# Patient Record
Sex: MALE | Race: WHITE | Employment: OTHER | URBAN - METROPOLITAN AREA
[De-identification: names, ages, dates, MRNs, and addresses within clinical notes are randomized per-mention and may not be internally consistent; named-entity substitution may affect disease eponyms.]

---

## 2018-08-04 ENCOUNTER — HOSPITAL ENCOUNTER (OUTPATIENT)
Dept: CT IMAGING | Age: 58
Discharge: HOME | End: 2018-08-04
Attending: INTERNAL MEDICINE
Payer: COMMERCIAL

## 2018-08-04 DIAGNOSIS — K86.1 OTHER CHRONIC PANCREATITIS (HCC): ICD-10-CM

## 2018-08-04 LAB — CREAT UR-MCNC: 1 MG/DL (ref 0.6–1.3)

## 2018-08-04 PROCEDURE — 82565 ASSAY OF CREATININE: CPT

## 2018-08-04 PROCEDURE — 74011636320 HC RX REV CODE- 636/320: Performed by: INTERNAL MEDICINE

## 2018-08-04 PROCEDURE — 74160 CT ABDOMEN W/CONTRAST: CPT

## 2018-08-04 RX ADMIN — IOPAMIDOL 100 ML: 612 INJECTION, SOLUTION INTRAVENOUS at 09:00

## 2018-08-14 ENCOUNTER — HOSPITAL ENCOUNTER (OUTPATIENT)
Dept: CT IMAGING | Age: 58
Discharge: HOME | End: 2018-08-14
Attending: INTERNAL MEDICINE
Payer: COMMERCIAL

## 2018-08-14 DIAGNOSIS — R91.1 SOLITARY PULMONARY NODULE: ICD-10-CM

## 2018-08-14 DIAGNOSIS — R91.8 OTHER NONSPECIFIC ABNORMAL FINDING OF LUNG FIELD: ICD-10-CM

## 2018-08-14 PROCEDURE — 74011636320 HC RX REV CODE- 636/320: Performed by: INTERNAL MEDICINE

## 2018-08-14 PROCEDURE — 71260 CT THORAX DX C+: CPT

## 2018-08-14 RX ADMIN — IOPAMIDOL 100 ML: 612 INJECTION, SOLUTION INTRAVENOUS at 07:30

## 2020-07-27 ENCOUNTER — OFFICE VISIT (OUTPATIENT)
Dept: CARDIOLOGY CLINIC | Age: 60
End: 2020-07-27

## 2020-07-27 VITALS
OXYGEN SATURATION: 95 % | HEIGHT: 75 IN | DIASTOLIC BLOOD PRESSURE: 78 MMHG | BODY MASS INDEX: 35.31 KG/M2 | HEART RATE: 75 BPM | SYSTOLIC BLOOD PRESSURE: 126 MMHG | WEIGHT: 284 LBS

## 2020-07-27 DIAGNOSIS — E78.5 DYSLIPIDEMIA: ICD-10-CM

## 2020-07-27 DIAGNOSIS — G47.33 OBSTRUCTIVE SLEEP APNEA SYNDROME: ICD-10-CM

## 2020-07-27 DIAGNOSIS — E03.9 HYPOTHYROIDISM, UNSPECIFIED TYPE: ICD-10-CM

## 2020-07-27 DIAGNOSIS — Z98.890 STATUS POST CATHETER ABLATION OF ATRIAL FIBRILLATION: ICD-10-CM

## 2020-07-27 DIAGNOSIS — I48.0 PAROXYSMAL ATRIAL FIBRILLATION (HCC): Primary | ICD-10-CM

## 2020-07-27 RX ORDER — FENOFIBRATE 145 MG/1
145 TABLET, COATED ORAL DAILY
COMMUNITY
Start: 2020-06-29

## 2020-07-27 RX ORDER — LEVOTHYROXINE SODIUM 50 UG/1
50 TABLET ORAL
COMMUNITY
Start: 2020-06-30

## 2020-07-27 RX ORDER — MV/FA/DHA/EPA/FISH OIL/SAW/GNK 400MCG-200
1 COMBINATION PACKAGE (EA) ORAL DAILY
COMMUNITY

## 2020-07-27 RX ORDER — BISMUTH SUBSALICYLATE 262 MG
1 TABLET,CHEWABLE ORAL DAILY
COMMUNITY

## 2020-07-27 NOTE — PROGRESS NOTES
HISTORY OF PRESENT ILLNESS  Guadalupe Ng is a 62 y.o. male. Shortness of Breath   Pertinent negatives include no fever, no headaches, no cough, no wheezing, no PND, no orthopnea, no chest pain, no vomiting, no abdominal pain, no rash, no leg swelling and no claudication. New Patient   Associated symptoms include shortness of breath. Pertinent negatives include no chest pain, no abdominal pain and no headaches. Dizziness   Associated symptoms include shortness of breath. Pertinent negatives include no chest pain, no abdominal pain and no headaches. Fatigue   Associated symptoms include shortness of breath. Pertinent negatives include no chest pain, no abdominal pain and no headaches. Patient presents for a new office visit. Patient has a past medical history significant for paroxysmal atrial fibrillation, status post atrial fibrillation ablation in 2007. The patient also has a history of obstructive sleep apnea on nightly CPAP, dyslipidemia and hypothyroidism. The patient last underwent noninvasive cardiac testing with a stress echocardiogram in 2015 which was a normal study at [facility]. He has not been seen in a cardiologist office in over 7 years. He returns today complaining of worsening exertional dyspnea, fatigue and dizziness over the past 6 months. He states that symptoms are somewhat similar to his prior symptoms when he was diagnosed with atrial fibrillation many years ago. Patient reports he was last seen by his primary care physician about 1 year ago and was told he was in normal rhythm on an EKG in the office. He denies any chest pain or pressure. No leg swelling or claudication, no orthopnea no PND. He does complain of a near syncopal episode while he was in the store shopping. No rafaela syncope. Overall he feels his activity tolerance has significantly decreased over the past 6 to 9 months.     Past Medical History:   Diagnosis Date    Cro[facility] ([facility]     Dyslipidemia     History of cardiovascular stress test 09/2015    Normal stress echocardiogram    History of dizziness     Likely from autonomic insufficiency. Occasional brief loss of vision (10-15 secs).  History of echocardiogram 11/07/2012    EF 55-60%. No WMA. Normal RVSP/PASP.  Paroxysmal atrial fibrillation ([facility] 2007    s/p afib ablation    S/P ablation of atrial fibrillation 2007    Sleep apnea 2005-6    CPAP at night      Current Outpatient Medications   Medication Sig Dispense Refill    synthroid 50 mcg tablet       fenofibrate nanocrystallized (TRICOR) 145 mg tablet       krill oil 500 mg cap Take  by mouth.  multivitamin (ONE A DAY) tablet Take 1 Tab by mouth daily.  rivaroxaban (Xarelto) 20 mg tab tablet Take 1 Tab by mouth daily. 30 Tab 6    naproxen sodium (Aleve) 220 mg tablet Take 220 mg by mouth as needed. Allergies   Allergen Reactions    Cardizem [Diltiazem Hcl] Other (comments)     Headache. Review of Systems   Constitutional: Positive for fatigue. Negative for chills, fever and weight loss. HENT: Negative for nosebleeds. Eyes: Negative for blurred vision and double vision. Respiratory: Positive for shortness of breath. Negative for cough and wheezing. Cardiovascular: Negative for chest pain, palpitations, orthopnea, claudication, leg swelling and PND. Gastrointestinal: Negative for abdominal pain, heartburn, nausea and vomiting. Genitourinary: Negative for dysuria and hematuria. Musculoskeletal: Negative for falls and myalgias. Skin: Negative for rash. Neurological: Positive for dizziness. Negative for focal weakness and headaches. Endo/Heme/Allergies: Does not bruise/bleed easily. Psychiatric/Behavioral: Negative for substance abuse.      Visit Vitals  /78 (BP 1 Location: Left arm, BP Patient Position: Sitting)   Pulse 75   Ht 6' 3\" (1.905 m)   Wt 128.8 kg (284 lb)   SpO2 95%   BMI 35.50 kg/m² Physical Exam   Constitutional: He is oriented to person, place, and time. He appears well-developed and well-nourished. HENT:   Head: Normocephalic and atraumatic. Eyes: Conjunctivae are normal.   Neck: Neck supple. No JVD present. Carotid bruit is not present. Cardiovascular: Normal rate, S1 normal, S2 normal and normal pulses. An irregularly irregular rhythm present. Exam reveals distant heart sounds. Exam reveals no gallop and no S3. No murmur heard. Pulmonary/Chest: Breath sounds normal. He has no wheezes. He has no rales. Abdominal: Soft. Bowel sounds are normal. There is no abdominal tenderness. Musculoskeletal:         General: No tenderness, deformity or edema. Neurological: He is alert and oriented to person, place, and time. Skin: Skin is warm and dry. Psychiatric: He has a normal mood and affect. His behavior is normal. Thought content normal.     EKG: Suspected limb lead reversal, atrial fibrillation, right axis deviation, no ST-T wave changes concerning for ischemia. Compared to the previous EKG atrial fibrillation has replaced sinus rhythm, axis is shifted to the right    ASSESSMENT and PLAN    Paroxysmal atrial fibrillation. Status post atrial fibrillation ablation 2007. Patient is back in a rate controlled atrial fibrillation today. My suspicion is this may have been present on and off for the past 6 to 9 months given his symptom onset. I have recommended a trial of sinus rhythm with cardioversion after he has been anticoagulated for a total of 4 weeks. A prescription for Xarelto 20 mg daily was given to the patient. No need for rate control at this point. Patient is scheduled to have follow-up lab work through his PCPs office next week, so I have recommended he get a full electrolyte panel including magnesium level and a thyroid panel. Lastly, I have recommended a repeat echocardiogram.    Dyslipidemia.   Patient primarily has elevated triglycerides and he has been managed with fenofibrate and krill oil by his PCP. Hypothyroidism. Patient is been on a stable dose of Synthroid for many years now. Patient scheduled to have repeat blood work next week. Obstructive sleep apnea. On nightly CPAP. Followup in 4 weeks with our nurse practitioner. If he remains in atrial fibrillation at that time, I would like to schedule him for elective cardioversion to try and restore sinus rhythm. He should remain on oral anticoagulation at least for several months.

## 2020-07-27 NOTE — PATIENT INSTRUCTIONS
If you have not heard from the central scheduler to schedule your testing in 48 hours, please call [phone]. Start Xarelto 20 mg daily Get lab work  Done Mag ( add to PCP labs )

## 2020-07-27 NOTE — PROGRESS NOTES
Aiyana Bazzi presents today for   Chief Complaint   Patient presents with    New Patient     last seen in 2013    Shortness of Breath     with and without exertion    Dizziness     lightheaded all the time    Fatigue     all the time    Numbness     bottom of both feet       Aiyana Bazzi preferred language for health care discussion is english/other. Is someone accompanying this pt? no    Is the patient using any DME equipment during [address]? no    Depression Screening:  3 most recent PHQ Screens 7/27/2020   Little interest or pleasure in doing things Not at all   Feeling down, depressed, irritable, or hopeless Not at all   Total Score PHQ 2 0       Learning Assessment:  No flowsheet data found. Abuse Screening:  Abuse Screening Questionnaire 7/27/2020   Do you ever feel afraid of your partner? N   Are you in a relationship with someone who physically or mentally threatens you? N   Is it safe for you to go home? Y       Fall Risk  Fall Risk Assessment, last 12 mths 7/27/2020   Able to walk? Yes   Fall in past 12 months? No       Pt currently taking Anticoagulant therapy? no    Coordination of Care:  1. Have you been to the ER, urgent care clinic since your last visit? Hospitalized since your last visit? no    2. Have you seen or consulted any other health care providers outside of the [address] [place] [address] since your last visit? Include any pap smears or colon screening.  no

## 2020-07-28 DIAGNOSIS — I48.0 PAROXYSMAL ATRIAL FIBRILLATION (HCC): Primary | ICD-10-CM

## 2020-07-28 DIAGNOSIS — E03.9 HYPOTHYROIDISM, UNSPECIFIED TYPE: ICD-10-CM

## 2020-07-29 ENCOUNTER — HOSPITAL ENCOUNTER (OUTPATIENT)
Dept: LAB | Age: 60
Discharge: HOME | End: 2020-07-29
Payer: COMMERCIAL

## 2020-07-29 DIAGNOSIS — E03.9 HYPOTHYROIDISM, UNSPECIFIED TYPE: ICD-10-CM

## 2020-07-29 DIAGNOSIS — I48.0 PAROXYSMAL ATRIAL FIBRILLATION (HCC): ICD-10-CM

## 2020-07-29 LAB
ALBUMIN SERPL-MCNC: 3.9 G/DL (ref 3.4–5)
ALBUMIN/GLOB SERPL: 1.3 {RATIO} (ref 0.8–1.7)
ALP SERPL-CCNC: 48 U/L (ref 45–117)
ALT SERPL-CCNC: 30 U/L (ref 16–61)
ANION GAP SERPL CALC-SCNC: 4 MMOL/L (ref 3–18)
AST SERPL-CCNC: 19 U/L (ref 10–38)
BILIRUB SERPL-MCNC: 0.4 MG/DL (ref 0.2–1)
BUN SERPL-MCNC: 27 MG/DL (ref 7–18)
BUN/CREAT SERPL: 23 (ref 12–20)
CALCIUM SERPL-MCNC: 9.5 MG/DL (ref 8.5–10.1)
CHLORIDE SERPL-SCNC: 108 MMOL/L (ref 100–111)
CO2 SERPL-SCNC: 30 MMOL/L (ref 21–32)
CREAT SERPL-MCNC: 1.17 MG/DL (ref 0.6–1.3)
GLOBULIN SER CALC-MCNC: 2.9 G/DL (ref 2–4)
GLUCOSE SERPL-MCNC: 73 MG/DL (ref 74–99)
MAGNESIUM SERPL-MCNC: 2.1 MG/DL (ref 1.6–2.6)
POTASSIUM SERPL-SCNC: 4.4 MMOL/L (ref 3.5–5.5)
PROT SERPL-MCNC: 6.8 G/DL (ref 6.4–8.2)
SODIUM SERPL-SCNC: 142 MMOL/L (ref 136–145)
T4 FREE SERPL-MCNC: 1 NG/DL (ref 0.7–1.5)
TSH SERPL DL<=0.05 MIU/L-ACNC: 1.18 UIU/ML (ref 0.36–3.74)

## 2020-07-29 PROCEDURE — 80053 COMPREHEN METABOLIC PANEL: CPT

## 2020-07-29 PROCEDURE — 83735 ASSAY OF MAGNESIUM: CPT

## 2020-07-29 PROCEDURE — 84439 ASSAY OF FREE THYROXINE: CPT

## 2020-07-29 PROCEDURE — 36415 COLL VENOUS BLD VENIPUNCTURE: CPT

## 2020-08-03 ENCOUNTER — HOSPITAL ENCOUNTER (OUTPATIENT)
Dept: NON INVASIVE DIAGNOSTICS | Age: 60
Discharge: HOME | End: 2020-08-03
Attending: INTERNAL MEDICINE
Payer: COMMERCIAL

## 2020-08-03 VITALS
WEIGHT: 284 LBS | BODY MASS INDEX: 35.31 KG/M2 | HEIGHT: 75 IN | SYSTOLIC BLOOD PRESSURE: 126 MMHG | DIASTOLIC BLOOD PRESSURE: 78 MMHG

## 2020-08-03 DIAGNOSIS — I48.0 PAROXYSMAL ATRIAL FIBRILLATION (HCC): ICD-10-CM

## 2020-08-03 PROCEDURE — 93306 TTE W/DOPPLER COMPLETE: CPT

## 2020-08-04 LAB
ECHO AO ROOT DIAM: 3.66 CM
ECHO LA AREA 4C: 21.43 CM2
ECHO LA VOL 2C: 66.01 ML (ref 18–58)
ECHO LA VOL 4C: 55.12 ML (ref 18–58)
ECHO LA VOL BP: 71.61 ML (ref 18–58)
ECHO LA VOL/BSA BIPLANE: 28.12 ML/M2 (ref 16–28)
ECHO LA VOLUME INDEX A2C: 25.92 ML/M2 (ref 16–28)
ECHO LA VOLUME INDEX A4C: 21.64 ML/M2 (ref 16–28)
ECHO LV INTERNAL DIMENSION DIASTOLIC: 5.32 CM (ref 4.2–5.9)
ECHO LV INTERNAL DIMENSION SYSTOLIC: 2.79 CM
ECHO LV IVSD: 1.31 CM (ref 0.6–1)
ECHO LV MASS 2D: 274.7 G (ref 88–224)
ECHO LV MASS INDEX 2D: 107.9 G/M2 (ref 49–115)
ECHO LV POSTERIOR WALL DIASTOLIC: 1.2 CM (ref 0.6–1)
ECHO LVOT CARDIAC OUTPUT: 4.79 LITER/MINUTE
ECHO LVOT DIAM: 2.25 CM
ECHO LVOT PEAK GRADIENT: 3.04 MMHG
ECHO LVOT PEAK VELOCITY: 87.23 CM/S
ECHO LVOT SV: 61.9 ML
ECHO LVOT VTI: 15.58 CM
ECHO RV TAPSE: 2.47 CM (ref 1.5–2)
ECHO TV REGURGITANT MAX VELOCITY: 301.14 CM/S
ECHO TV REGURGITANT PEAK GRADIENT: 36.27 MMHG
LVOT MG: 1.5 MMHG

## 2020-08-07 NOTE — PROGRESS NOTES
Per your last note \" Paroxysmal atrial fibrillation. Status post atrial fibrillation ablation 2007. Patient is back in a rate controlled atrial fibrillation today. My suspicion is this may have been present on and off for the past 6 to 9 months given his symptom onset. I have recommended a trial of sinus rhythm with cardioversion after he has been anticoagulated for a total of 4 weeks. A prescription for Xarelto 20 mg daily was given to the patient. No need for rate control at this point. Patient is scheduled to have follow-up lab work through his PCPs office next week, so I have recommended he get a full electrolyte panel including magnesium level and a thyroid panel.   Lastly, I have recommended a repeat echocardiogram.

## 2020-08-17 NOTE — PROGRESS NOTES
Teo Cornelius presents today for a 4 week follow-up. He was seen by Dr. Warnell Osler in late July 2020. During that visit he was found to be in atrial fibrillation that was rate controlled. Based on his symptoms, he may have gone into atrial fibrillation 6 to 9 months prior to being seen. He was started on anticoagulation (Xarelto 20mg daily) and elective cardioversion was discussed after being anticoagulated for 4 weeks. He was asked to return today for re-evaluation. If he is still in atrial fibrillation, elective cardioversion will be scheduled. He had an echo done on 8/3/20 and it showed an EF of 55-60%, no WMA, mild aortic root dilatation at 3.7cm and moderate pulmonary hypertension with PASP 51 mmHg. He is a 62year old male with history of paroxysmal atrial fibrillation, status post atrial fibrillation ablation in 2007. The patient also has a history of obstructive sleep apnea on nightly CPAP, dyslipidemia and hypothyroidism. Denies chest pain, tightness, heaviness, and palpitations. Denies shortness of breath at rest, admits to dyspnea on exertion, orthopnea and PND. Denies abdominal bloating. Denies lightheadedness, admits to dizziness, and denies syncope. Denies lower extremity edema and claudication. Denies nausea, vomiting, diarrhea, melena, hematochezia. Denies hematuria, urgency, frequency. Denies fever, chills. He states that he is unaware of the arrhythmia aside from the HURTADO and dizziness. He had labs done through his PCP's office on 8/17/20 and his Hgb A1c was 6.1, free T4 was 0.98 (norm), TSH 1.74 (norm), BUN 21, Creat 1.0, potassium 4.7, Na 143, Total cholesterol 1 64, Trig 104, HDL 49, LDL 94. Hepatic panel was WNL. PMH:  Past Medical History:   Diagnosis Date    [facility] ([facility]     Dyslipidemia     History of cardiovascular stress test 09/2015    Normal stress echocardiogram    History of dizziness     Likely from autonomic insufficiency.   Occasional brief loss of vision (10-15 secs).  History of echocardiogram 11/07/2012    EF 55-60%. No WMA. Normal RVSP/PASP.  Paroxysmal atrial fibrillation ([address] 2007    s/p afib ablation    S/P ablation of atrial fibrillation 2007    Sleep apnea 2005-6    CPAP at night       PSH:  Past Surgical History:   Procedure Laterality Date    HX AFIB ABLATION      HX HERNIA REPAIR      X2    HX OTHER SURGICAL      arms    HX OTHER SURGICAL      Toe on left foot reattached after accident.  HX TONSILLECTOMY         MEDS:  Current Outpatient Medications   Medication Sig    synthroid 50 mcg tablet     fenofibrate nanocrystallized (TRICOR) 145 mg tablet     krill oil 500 mg cap Take  by mouth.  multivitamin (ONE A DAY) tablet Take 1 Tab by mouth daily.  rivaroxaban (Xarelto) 20 mg tab tablet Take 1 Tab by mouth daily.  naproxen sodium (Aleve) 220 mg tablet Take 220 mg by mouth as needed. No current facility-administered medications for this visit. Allergies and Sensitivities:  Allergies   Allergen Reactions    Cardizem [Diltiazem Hcl] Other (comments)     Headache. Family History:  No family history on file. Social History:  He  reports that he has never smoked. He has never used smokeless tobacco.  He  reports no history of alcohol use. Review of Systems:  As above, otherwise 10 point review negative. Physical:  Visit Vitals  /76 (BP 1 Location: Left arm, BP Patient Position: Sitting)   Pulse 67   Ht 6' 3\" (1.905 m)   Wt 128.4 kg (283 lb)   SpO2 98%   BMI 35.37 kg/m²         Exam:  Neck:  Supple, no JVD, no carotid bruits  CV:  Normal S1 and  S2, no murmurs, rubs, or gallops noted. Irregularly, irregular  Lungs:  Clear to ausculation throughout, no wheezes or rales  Abd:  Soft, non-tender, non-distended with good bowel sounds.   No hepatosplenomegaly  Extremities:  No edema      Data:  EKG:      LABS:  Lab Results   Component Value Date/Time    Sodium 142 07/29/2020 11:39 AM Potassium 4.4 07/29/2020 11:39 AM    Chloride 108 07/29/2020 11:39 AM    CO2 30 07/29/2020 11:39 AM    Glucose 73 (L) 07/29/2020 11:39 AM    BUN 27 (H) 07/29/2020 11:39 AM    Creatinine 1.17 07/29/2020 11:39 AM     No results found for: CHOL, CHOLX, CHLST, CHOLV, HDL, HDLP, LDL, LDLC, DLDLP, TGLX, TRIGL, TRIGP, CHHD, CHHDX  Lab Results   Component Value Date/Time    ALT (SGPT) 30 07/29/2020 11:39 AM         Impression/Plan:  1. Atrial fibrillation, s/p ablation in 2007, anticoagulated with Xarelto 20mg and rate controlled  2. Obstructive sleep apnea, using CPAP  3. Dyslipidemia, on TriCor 145 mg  4. Hypothyroidism    Mr. Martin Ortiz was seen today for re-evaluation of atrial fibrillation. Per Dr. Isabella Coon last office note dated 7/27/20, if he continues to be in atrial fibrillation, cardioversion would be scheduled. Mr. Martin Ortiz states that he is unaware of the atrial fibrillation as far as palpitations are concerned. He is symptomatic with it as he states that he experiences dyspnea on exertion and dizziness when he is in atrial fibrillation. His EKG today shows atrial fibrillation, rate controlled. He is anticoagulated with Xarelto which he is tolerating. He denies any other cardiac complaints. He states that he is going on vacation beginning this Wednesday, 8/26/20 and will be gone for about 10 days. Therefore, he is scheduled for cardioversion on 9/17/20 @ 8:00am.  Pre-procedure instructions were given by Funmilayo Walker and he was also given a lab slip for a BMP to be done within 7 days of the scheduled procedure. The procedure was discussed with him, his questions were answered, and he is ready to proceed. He was advised to continue his Xarelto. He will follow-up with Dr. Hollis Carroll as scheduled and as needed. Maryann Abdalla MSN, FNP-BC    Please note:  Portions of this chart were created with Dragon medical speech to text program.  Unrecognized errors may be present.

## 2020-08-24 ENCOUNTER — OFFICE VISIT (OUTPATIENT)
Dept: CARDIOLOGY CLINIC | Age: 60
End: 2020-08-24

## 2020-08-24 VITALS
BODY MASS INDEX: 35.19 KG/M2 | HEIGHT: 75 IN | OXYGEN SATURATION: 98 % | WEIGHT: 283 LBS | DIASTOLIC BLOOD PRESSURE: 76 MMHG | SYSTOLIC BLOOD PRESSURE: 132 MMHG | HEART RATE: 67 BPM

## 2020-08-24 DIAGNOSIS — I48.0 PAROXYSMAL ATRIAL FIBRILLATION (HCC): Primary | ICD-10-CM

## 2020-08-24 DIAGNOSIS — G47.33 OBSTRUCTIVE SLEEP APNEA SYNDROME: ICD-10-CM

## 2020-08-24 DIAGNOSIS — E78.5 DYSLIPIDEMIA: ICD-10-CM

## 2020-08-24 DIAGNOSIS — E66.01 SEVERE OBESITY (HCC): ICD-10-CM

## 2020-08-24 NOTE — PROGRESS NOTES
Roxy Lyon presents today for   Chief Complaint   Patient presents with    Irregular Heart Beat     4 week follow up        Roxy Lyon preferred language for health care discussion is english/other. Is someone accompanying this pt? no    Is the patient using any DME equipment during [address]? no    Depression Screening:  3 most recent PHQ Screens 8/24/2020   Little interest or pleasure in doing things Not at all   Feeling down, depressed, irritable, or hopeless Not at all   Total Score PHQ 2 0       Learning Assessment:  No flowsheet data found. Abuse Screening:  Abuse Screening Questionnaire 8/24/2020   Do you ever feel afraid of your partner? N   Are you in a relationship with someone who physically or mentally threatens you? N   Is it safe for you to go home? Y       Fall Risk  Fall Risk Assessment, last 12 mths 8/24/2020   Able to walk? Yes   Fall in past 12 months? No       Pt currently taking Anticoagulant therapy? Xarelto    Coordination of Care:  1. Have you been to the ER, urgent care clinic since your last visit? Hospitalized since your last visit? no    2. Have you seen or consulted any other health care providers outside of the [address] [place] [address] since your last visit? Include any pap smears or colon screening.  no

## 2020-09-03 ENCOUNTER — TELEPHONE (OUTPATIENT)
Dept: CARDIOLOGY CLINIC | Age: 60
End: 2020-09-03

## 2020-09-03 NOTE — TELEPHONE ENCOUNTER
----- Message from Mervin Acevedo MD sent at 8/3/2020  9:31 AM EDT -----  Please let the patient know that all of his labs were normal.  ----- Message -----  From: Dmitri Morales RN  Sent: 7/31/2020  11:48 AM EDT  To: Mervin Acevedo MD    Per your last note \" Paroxysmal atrial fibrillation. Status post atrial fibrillation ablation 2007. Patient is back in a rate controlled atrial fibrillation today. My suspicion is this may have been present on and off for the past 6 to 9 months given his symptom onset. I have recommended a trial of sinus rhythm with cardioversion after he has been anticoagulated for a total of 4 weeks. A prescription for Xarelto 20 mg daily was given to the patient. No need for rate control at this point. Patient is scheduled to have follow-up lab work through his PCPs office next week, so I have recommended he get a full electrolyte panel including magnesium level and a thyroid panel. Lastly, I have recommended a repeat echocardiogram.     Dyslipidemia. Patient primarily has elevated triglycerides and he has been managed with fenofibrate and krill oil by his PCP.     Hypothyroidism. Patient is been on a stable dose of Synthroid for many years now. Patient scheduled to have repeat blood work next week.

## 2020-09-03 NOTE — TELEPHONE ENCOUNTER
Called patient to give him his lab results. Verified name and . Gave patient results and he fully understood.

## 2020-09-17 ENCOUNTER — ANESTHESIA (OUTPATIENT)
Dept: CARDIAC CATH/INVASIVE PROCEDURES | Age: 60
End: 2020-09-17
Payer: COMMERCIAL

## 2020-09-17 ENCOUNTER — HOSPITAL ENCOUNTER (OUTPATIENT)
Age: 60
Setting detail: OUTPATIENT SURGERY
Discharge: HOME | End: 2020-09-17
Attending: INTERNAL MEDICINE | Admitting: INTERNAL MEDICINE
Payer: COMMERCIAL

## 2020-09-17 ENCOUNTER — ANESTHESIA EVENT (OUTPATIENT)
Dept: CARDIAC CATH/INVASIVE PROCEDURES | Age: 60
End: 2020-09-17
Payer: COMMERCIAL

## 2020-09-17 VITALS
HEART RATE: 68 BPM | WEIGHT: 280 LBS | SYSTOLIC BLOOD PRESSURE: 153 MMHG | BODY MASS INDEX: 34.82 KG/M2 | DIASTOLIC BLOOD PRESSURE: 95 MMHG | HEIGHT: 75 IN | RESPIRATION RATE: 34 BRPM | OXYGEN SATURATION: 100 %

## 2020-09-17 DIAGNOSIS — I48.91 AFIB (HCC): ICD-10-CM

## 2020-09-17 LAB
ATRIAL RATE: 77 BPM
BUN BLD-MCNC: 27 MG/DL (ref 7–18)
CALCULATED P AXIS, ECG09: 33 DEGREES
CALCULATED R AXIS, ECG10: 19 DEGREES
CALCULATED T AXIS, ECG11: 40 DEGREES
CHLORIDE BLD-SCNC: 105 MMOL/L (ref 100–108)
DIAGNOSIS, 93000: NORMAL
GLUCOSE BLD STRIP.AUTO-MCNC: 92 MG/DL (ref 74–106)
HCT VFR BLD CALC: 46 % (ref 36–49)
HGB BLD-MCNC: 15.6 G/DL (ref 12–16)
P-R INTERVAL, ECG05: 256 MS
POTASSIUM BLD-SCNC: 4.2 MMOL/L (ref 3.5–5.5)
Q-T INTERVAL, ECG07: 430 MS
QRS DURATION, ECG06: 96 MS
QTC CALCULATION (BEZET), ECG08: 486 MS
SODIUM BLD-SCNC: 141 MMOL/L (ref 136–145)
VENTRICULAR RATE, ECG03: 77 BPM

## 2020-09-17 PROCEDURE — 76060000031 HC ANESTHESIA FIRST 0.5 HR: Performed by: INTERNAL MEDICINE

## 2020-09-17 PROCEDURE — 74011250636 HC RX REV CODE- 250/636: Performed by: NURSE ANESTHETIST, CERTIFIED REGISTERED

## 2020-09-17 PROCEDURE — 82947 ASSAY GLUCOSE BLOOD QUANT: CPT

## 2020-09-17 PROCEDURE — 93005 ELECTROCARDIOGRAM TRACING: CPT

## 2020-09-17 PROCEDURE — 92960 CARDIOVERSION ELECTRIC EXT: CPT | Performed by: INTERNAL MEDICINE

## 2020-09-17 RX ORDER — SODIUM CHLORIDE 9 MG/ML
INJECTION, SOLUTION INTRAVENOUS
Status: DISCONTINUED | OUTPATIENT
Start: 2020-09-17 | End: 2020-09-17 | Stop reason: HOSPADM

## 2020-09-17 RX ORDER — PROPOFOL 10 MG/ML
INJECTION, EMULSION INTRAVENOUS AS NEEDED
Status: DISCONTINUED | OUTPATIENT
Start: 2020-09-17 | End: 2020-09-17 | Stop reason: HOSPADM

## 2020-09-17 RX ADMIN — SODIUM CHLORIDE: 900 INJECTION, SOLUTION INTRAVENOUS at 08:45

## 2020-09-17 RX ADMIN — PROPOFOL 50 MG: 10 INJECTION, EMULSION INTRAVENOUS at 09:01

## 2020-09-17 NOTE — ANESTHESIA POSTPROCEDURE EVALUATION
Procedure(s):  EP CARDIOVERSION.     general - backup    Anesthesia Post Evaluation      Multimodal analgesia: multimodal analgesia not used between 6 hours prior to anesthesia start to PACU discharge  Patient participation: complete - patient participated  Level of consciousness: awake  Pain management: satisfactory to patient  Airway patency: patent  Anesthetic complications: no  Cardiovascular status: acceptable  Respiratory status: acceptable  Hydration status: acceptable  Post anesthesia nausea and vomiting:  none      INITIAL Post-op Vital signs:   Vitals Value Taken Time   /91 9/17/2020  9:06 AM   Temp     Pulse 82 9/17/2020  9:06 AM   Resp 14 9/17/2020  9:06 AM   SpO2 100 % 9/17/2020  9:06 AM

## 2020-09-17 NOTE — DISCHARGE INSTRUCTIONS
DISCHARGE SUMMARY from Nurse    PATIENT INSTRUCTIONS:    After general anesthesia or intravenous sedation, for 24 hours or while taking prescription Narcotics:  · Limit your activities  · Do not drive and operate hazardous machinery  · Do not make important personal or business decisions  · Do  not drink alcoholic beverages  · If you have not urinated within 8 hours after discharge, please contact your surgeon on call. Report the following to your surgeon:  · Excessive pain, swelling, redness or odor of or around the surgical area  · Temperature over 100.5  · Nausea and vomiting lasting longer than 4 hours or if unable to take medications  · Any signs of decreased circulation or nerve impairment to extremity: change in color, persistent  numbness, tingling, coldness or increase pain  · Any questions    What to do at Home:  Recommended activity: Activity as tolerated and no driving for today. If you experience any of the following symptoms pain unrelieved by over the counter pain medication, please follow up with Dr. Tristan Guerrero. *  Please give a list of your current medications to your Primary Care Provider. *  Please update this list whenever your medications are discontinued, doses are      changed, or new medications (including over-the-counter products) are added. *  Please carry medication information at all times in case of emergency situations. These are general instructions for a healthy lifestyle:    No smoking/ No tobacco products/ Avoid exposure to second hand smoke  Surgeon General's Warning:  Quitting smoking now greatly reduces serious risk to your health.     Obesity, smoking, and sedentary lifestyle greatly increases your risk for illness    A healthy diet, regular physical exercise & weight monitoring are important for maintaining a healthy lifestyle    You may be retaining fluid if you have a history of heart failure or if you experience any of the following symptoms:  Weight gain of 3 pounds or more overnight or 5 pounds in a week, increased swelling in our hands or feet or shortness of breath while lying flat in bed. Please call your doctor as soon as you notice any of these symptoms; do not wait until your next office visit. The discharge information has been reviewed with the patient. The patient verbalized understanding. Discharge medications reviewed with the patient and appropriate educational materials and side effects teaching were provided. ___________________________________________________________________________________________________________________________________      Patient Education        Electrical Cardioversion: Care Instructions  Your Care Instructions     Electrical cardioversion is a treatment for an abnormal heartbeat. For example, it may be used to treat atrial fibrillation. In cardioversion, a brief electric shock is given to the heart to reset its rhythm. The shock comes through patches that are put on the outside of your chest. Cardioversion most often restores the heartbeat to normal.  After the procedure, you may have redness where the patches were. (This may look like a sunburn.) Do not drive until the day after a cardioversion. You can eat and drink when you feel ready to. Your doctor may have you take medicines daily to help the heart beat in a normal way and to prevent blood clots. Your doctor may give you medicine before and after cardioversion. This is to help keep your heart in a normal rhythm after the procedure. Instead of electric cardioversion, your doctor may try to change your heartbeat to a normal rhythm by giving you medicine. This is most often done in a clinic or hospital.  You may have had a sedative to help you relax. You may be unsteady after having sedation. It can take a few hours for the medicine's effects to wear off. Common side effects of sedation include nausea, vomiting, and feeling sleepy or tired.   The doctor has checked you carefully, but problems can develop later. If you notice any problems or new symptoms, get medical treatment right away. Follow-up care is a key part of your treatment and safety. Be sure to make and go to all appointments, and call your doctor if you are having problems. It's also a good idea to know your test results and keep a list of the medicines you take. How can you care for yourself at home? Medicines    · If the doctor gave you a sedative:  ? For 24 hours, don't do anything that requires attention to detail. This includes going to work, making important decisions, or signing any legal documents. It takes time for the medicine's effects to completely wear off.  ? For your safety, do not drive or operate any machinery that could be dangerous. Wait until the medicine wears off and you can think clearly and react easily.     · Take your medicines exactly as prescribed. Call your doctor if you think you are having a problem with your medicine. You may take some of the following medicines:  ? Antiarrhythmic medicines such as amiodarone. ? Beta-blockers such as propranolol (Inderal), metoprolol (Lopressor), and carvedilol (Coreg). ? Calcium channel blockers such as diltiazem (Cardizem) and verapamil (Calan). ? Digoxin (Lanoxin). You will get more details on the specific medicines your doctor prescribes.     · If you take a blood thinner, be sure you get instructions about how to take your medicine safely. Blood thinners can cause serious bleeding problems.     · Do not take any vitamins, over-the-counter medicines, or herbal products without talking to your doctor first.   Lifestyle changes    · Do not smoke. Smoking increases your risk of stroke and heart attack. If you need help quitting, talk to your doctor about stop-smoking programs and medicines.  These can increase your chances of quitting for good.     · Eat heart-healthy foods.     · Limit alcohol to 2 drinks a day for men and 1 drink a day for women. Activity    · If your doctor recommends it, get more exercise. Walking is a good choice. Bit by bit, increase the amount you walk every day. Try for 30 minutes on most days of the week. You also may want to swim, bike, or do other activities.     · When you exercise, watch for signs that your heart is working too hard. You are pushing too hard if you cannot talk while you are exercising. If you become short of breath or dizzy or have chest pain, sit down and rest immediately.     · Check your pulse regularly. Place two fingers on the artery at the palm side of your wrist in line with your thumb. If your heartbeat seems uneven or fast, talk to your doctor. When should you call for help? Call 911 anytime you think you may need emergency care. For example, call if:    · You have trouble breathing.     · You passed out (lost consciousness).     · You cough up pink, foamy mucus and you have trouble breathing.     · You have symptoms of a heart attack. These may include:  ? Chest pain or pressure, or a strange feeling in the chest.  ? Sweating. ? Shortness of breath. ? Nausea or vomiting. ? Pain, pressure, or a strange feeling in the back, neck, jaw, or upper belly or in one or both shoulders or arms. ? Lightheadedness or sudden weakness. ? A fast or irregular heartbeat. After you call 911, the  may tell you to chew 1 adult-strength or 2 to 4 low-dose aspirin. Wait for an ambulance. Do not try to drive yourself.     · You have symptoms of a stroke. These may include:  ? Sudden numbness, tingling, weakness, or loss of movement in your face, arm, or leg, especially on only one side of your body. ? Sudden vision changes. ? Sudden trouble speaking. ? Sudden confusion or trouble understanding simple statements. ? Sudden problems with walking or balance. ? A sudden, severe headache that is different from past headaches.    Call your doctor now or seek immediate medical care if:    · You have new or worse nausea or vomiting.     · You have new or increased shortness of breath.     · You are dizzy or lightheaded, or you feel like you may faint.     · You have sudden weight gain, such as more than 2 to 3 pounds in a day or 5 pounds in a week.     · You have increased swelling in your legs, ankles, or feet. Watch closely for changes in your health, and be sure to contact your doctor if you have any problems. Where can you learn more? Go to http://clau-ahmet.info/  Enter R378 in the search box to learn more about \"Electrical Cardioversion: Care Instructions. \"  Current as of: December 16, 2019               Content Version: 12.6  © 1798-8171 [employer]. Care instructions adapted under license by [employer] (which disclaims liability or warranty for this information). If you have questions about a medical condition or this instruction, always ask your healthcare professional. [place] [address] any warranty or liability for your use of this information.

## 2020-09-17 NOTE — PROGRESS NOTES
Discharge instructions discussed with patient. Patient verbalized understanding and had no further questions. PIV was removed without issue and patient was disconnected from the monitor. Pt has no c/o pain.

## 2020-09-17 NOTE — PROCEDURES
Procedure Note - Cardioversion:   Performed by Greg Sabillon MD .     Immediately prior to the procedure, the patient was reevaluated and found suitable for the planned procedure and any planned medications. Immediately prior to the procedure a time out was called to verify the correct patient, procedure, equipment, staff, and marking as appropriate. Cardioversion was indicated for a rhythm of atrial fibrillation and performed 2 times with a maximum delivered energy of 300 joules,  biphasic. Adequate procedural sedation was attained, see moderate sedation record. Patient's rhythm was normal sinus rhythm at the end of the procedure. Procedure was tolerated well.

## 2020-09-17 NOTE — PROGRESS NOTES
Melissa Jimenez presents today for a post-hospital follow-up s/p elective cardioversion. He underwent cardioversion on 9/17/20 and converted back to sinus rhythm. Unfortunately, he noticed a return of his symptoms of dizziness and shortness of breath not too long after the procedure was done. He does not notice the irregularity of his heart beat but each time he has gone into AFib, he experiences shortness of breath and dizziness. He is a 62year old male with history of paroxysmal atrial fibrillation, status post atrial fibrillation ablation in 2007. The patient also has a history of obstructive sleep apnea on nightly CPAP, dyslipidemia and hypothyroidism. e was seen by Dr. Dennie Gift in late July 2020. During that visit he was found to be in atrial fibrillation that was rate controlled. Based on his symptoms, he may have gone into atrial fibrillation 6 to 9 months prior to being seen. He was started on anticoagulation (Xarelto 20mg daily) and elective cardioversion was discussed after being anticoagulated for 4 weeks. He was asked to return today for re-evaluation. If he is still in atrial fibrillation, elective cardioversion will be scheduled. He had an echo done on 8/3/20 and it showed an EF of 55-60%, no WMA, mild aortic root dilatation at 3.7cm and moderate pulmonary hypertension with PASP 51 mmHg. Denies chest pain, tightness, heaviness, and palpitations. Denies shortness of breath at rest, admits to dyspnea on exertion, orthopnea and PND. Denies abdominal bloating. Denies lightheadedness, admits to dizziness, and denies syncope. Denies lower extremity edema and claudication. Denies nausea, vomiting, diarrhea, melena, hematochezia. Denies hematuria, urgency, frequency. Denies fever, chills.       He had labs done through his PCP's office on 8/17/20 and his Hgb A1c was 6.1, free T4 was 0.98 (norm), TSH 1.74 (norm), BUN 21, Creat 1.0, potassium 4.7, Na 143, Total cholesterol 1 64, Trig 104, HDL 49, LDL 94. Hepatic panel was WNL. PMH:  Past Medical History:   Diagnosis Date    [facility]     Dyslipidemia     History of cardiovascular stress test 09/2015    Normal stress echocardiogram    History of dizziness     Likely from autonomic insufficiency. Occasional brief loss of vision (10-15 secs).  History of echocardiogram 11/07/2012    EF 55-60%. No WMA. Normal RVSP/PASP.  Paroxysmal atrial fibrillation ([facility] [facility] 2007    s/p afib ablation    S/P ablation of atrial fibrillation 2007    Sleep apnea 2005-6    CPAP at night       PSH:  Past Surgical History:   Procedure Laterality Date    HX AFIB ABLATION      HX HERNIA REPAIR      X2    HX OTHER SURGICAL      arms    HX OTHER SURGICAL      Toe on left foot reattached after accident.  HX TONSILLECTOMY         MEDS:  Current Outpatient Medications   Medication Sig    synthroid 50 mcg tablet     fenofibrate nanocrystallized (TRICOR) 145 mg tablet     krill oil 500 mg cap Take  by mouth.  multivitamin (ONE A DAY) tablet Take 1 Tab by mouth daily.  rivaroxaban (Xarelto) 20 mg tab tablet Take 1 Tab by mouth daily.  naproxen sodium (Aleve) 220 mg tablet Take 220 mg by mouth as needed. No current facility-administered medications for this visit. Allergies and Sensitivities:  Allergies   Allergen Reactions    Cardizem [Diltiazem Hcl] Other (comments)     Headache. Family History:  No family history on file. Social History:  He  reports that he has never smoked. He has never used smokeless tobacco.  He  reports no history of alcohol use. Review of Systems:  As above, otherwise 10 point review negative. Physical:  Visit Vitals  /84   Pulse 63   Ht 6' 3\" (1.905 m)   Wt 127.5 kg (281 lb)   SpO2 98%   BMI 35.12 kg/m²     His weight is down 2 pounds since his last visit      Exam:  Neck:  Supple, no JVD, no carotid bruits  CV:  Normal S1 and  S2, no murmurs, rubs, or gallops noted. Irregularly, irregular  Lungs:  Clear to ausculation throughout, no wheezes or rales  Abd:  Soft, non-tender, non-distended with good bowel sounds. No hepatosplenomegaly  Extremities:  Trace lower extremity edema      Data:  EKG:      LABS:  Lab Results   Component Value Date/Time    Sodium 142 07/29/2020 11:39 AM    Potassium 4.4 07/29/2020 11:39 AM    Chloride 108 07/29/2020 11:39 AM    CO2 30 07/29/2020 11:39 AM    Glucose 73 (L) 07/29/2020 11:39 AM    BUN 27 (H) 07/29/2020 11:39 AM    Creatinine 1.17 07/29/2020 11:39 AM     No results found for: CHOL, CHOLX, CHLST, CHOLV, HDL, HDLP, LDL, LDLC, DLDLP, TGLX, TRIGL, TRIGP, CHHD, CHHDX  Lab Results   Component Value Date/Time    ALT (SGPT) 30 07/29/2020 11:39 AM         Impression/Plan:  1. Atrial fibrillation, s/p ablation in 2007, anticoagulated with Xarelto 20mg and rate controlled, s/p successful cardioversion on 9/17/20, but now back in AFib  2. Obstructive sleep apnea, using CPAP  3. Dyslipidemia, on TriCor 145 mg  4. Hypothyroidism  5. Dizziness/SOB associated with AFib (symptoms when he is in AFib)    Mr. Abril Severino was seen today for follow-up s/p cardioversion. He was cardioverted on 9/17/20 which was successful but unfortunately, he is back in atrial fibrillation. He is symptomatic and his symptoms are dizziness and shortness of breath. He would like to have another ablation done (history of ablation in 2007) so will refer him to Dr. Ines Lemon. Mr. Abril Severino informed me that he does not like to take medications as he is not good with remembering to take them all the time so he would rather have a procedure done to see if we can get him back into sinus rhythm. His blood pressure and heart rate are controlled. His EKG shows AFib. He remains on his Xarelto and was instructed to continue taking the medications. He will follow-up with Dr. Shar Rodriguez as scheduled and as needed.       Monica Morales MSN, FNP-BC    Please note:  Portions of this chart were created with Dragon medical speech to text program.  Unrecognized errors may be present.

## 2020-09-17 NOTE — ANESTHESIA PREPROCEDURE EVALUATION
Relevant Problems   No relevant active problems       Anesthetic History   No history of anesthetic complications            Review of Systems / Medical History  Patient summary reviewed and pertinent labs reviewed    Pulmonary        Sleep apnea           Neuro/Psych   Within defined limits           Cardiovascular            Dysrhythmias : atrial fibrillation  Hyperlipidemia    Exercise tolerance: >4 METS     GI/Hepatic/Renal  Within defined limits              Endo/Other      Hypothyroidism  Morbid obesity     Other Findings   Comments: Crohn disease (HCC)  Sleep apnea  Paroxysmal atrial fibrillation (HCC)  Dyslipidemia  History of dizziness  History of echocardiogram  History of cardiovascular stress test  S/P ablation of atrial fibrillation           Physical Exam    Airway  Mallampati: II  TM Distance: 4 - 6 cm  Neck ROM: normal range of motion   Mouth opening: Normal     Cardiovascular  Regular rate and rhythm,  S1 and S2 normal,  no murmur, click, rub, or gallop  Rhythm: regular  Rate: normal         Dental  No notable dental hx       Pulmonary  Breath sounds clear to auscultation               Abdominal  GI exam deferred       Other Findings            Anesthetic Plan    ASA: 3  Anesthesia type: general - backup          Induction: Intravenous

## 2020-09-17 NOTE — H&P
HISTORY OF PRESENT ILLNESS  Julian Christine is a 62 y.o. male.     Shortness of Breath   Pertinent negatives include no fever, no headaches, no cough, no wheezing, no PND, no orthopnea, no chest pain, no vomiting, no abdominal pain, no rash, no leg swelling and no claudication. New Patient   Associated symptoms include shortness of breath. Pertinent negatives include no chest pain, no abdominal pain and no headaches. Dizziness   Associated symptoms include shortness of breath. Pertinent negatives include no chest pain, no abdominal pain and no headaches. Fatigue   Associated symptoms include shortness of breath. Pertinent negatives include no chest pain, no abdominal pain and no headaches.         Patient presents for a new office visit. Patient has a past medical history significant for paroxysmal atrial fibrillation, status post atrial fibrillation ablation in 2007. The patient also has a history of obstructive sleep apnea on nightly CPAP, dyslipidemia and hypothyroidism.     The patient last underwent noninvasive cardiac testing with a stress echocardiogram in 2015 which was a normal study at [facility].     He has not been seen in a cardiologist office in over 7 years. He returns today complaining of worsening exertional dyspnea, fatigue and dizziness over the past 6 months. He states that symptoms are somewhat similar to his prior symptoms when he was diagnosed with atrial fibrillation many years ago. Patient reports he was last seen by his primary care physician about 1 year ago and was told he was in normal rhythm on an EKG in the office. He denies any chest pain or pressure. No leg swelling or claudication, no orthopnea no PND. He does complain of a near syncopal episode while he was in the store shopping.   No rafaela syncope.     Overall he feels his activity tolerance has significantly decreased over the past 6 to 9 months.          Past Medical History:   Diagnosis Date  Crohn disease ([facility]      Dyslipidemia      History of cardiovascular stress test 09/2015     Normal stress echocardiogram    History of dizziness       Likely from autonomic insufficiency. Occasional brief loss of vision (10-15 secs).  History of echocardiogram 11/07/2012     EF 55-60%. No WMA. Normal RVSP/PASP.  [facility] 2007     s/p afib ablation    S/P ablation of atrial fibrillation 2007    Sleep apnea 2005-6     CPAP at night             Current Outpatient Medications   Medication Sig Dispense Refill    synthroid 50 mcg tablet          fenofibrate nanocrystallized (TRICOR) 145 mg tablet          krill oil 500 mg cap Take  by mouth.        multivitamin (ONE A DAY) tablet Take 1 Tab by mouth daily.        rivaroxaban (Xarelto) 20 mg tab tablet Take 1 Tab by mouth daily. 30 Tab 6    naproxen sodium (Aleve) 220 mg tablet Take 220 mg by mouth as needed.                 Allergies   Allergen Reactions    Cardizem [Diltiazem Hcl] Other (comments)       Headache.            Review of Systems   Constitutional: Positive for fatigue. Negative for chills, fever and weight loss. HENT: Negative for nosebleeds. Eyes: Negative for blurred vision and double vision. Respiratory: Positive for shortness of breath. Negative for cough and wheezing. Cardiovascular: Negative for chest pain, palpitations, orthopnea, claudication, leg swelling and PND. Gastrointestinal: Negative for abdominal pain, heartburn, nausea and vomiting. Genitourinary: Negative for dysuria and hematuria. Musculoskeletal: Negative for falls and myalgias. Skin: Negative for rash. Neurological: Positive for dizziness. Negative for focal weakness and headaches. Endo/Heme/Allergies: Does not bruise/bleed easily.    Psychiatric/Behavioral: Negative for substance abuse.      Visit Vitals  /78 (BP 1 Location: Left arm, BP Patient Position: Sitting)   Pulse 75   Ht 6' 3\" (1.905 m)   Wt 128.8 kg (284 lb)   SpO2 95%   BMI 35.50 kg/m²      Physical Exam   Constitutional: He is oriented to person, place, and time. He appears well-developed and well-nourished. HENT:   Head: Normocephalic and atraumatic. Eyes: Conjunctivae are normal.   Neck: Neck supple. No JVD present. Carotid bruit is not present. Cardiovascular: Normal rate, S1 normal, S2 normal and normal pulses. An irregularly irregular rhythm present. Exam reveals distant heart sounds. Exam reveals no gallop and no S3. No murmur heard. Pulmonary/Chest: Breath sounds normal. He has no wheezes. He has no rales. Abdominal: Soft. Bowel sounds are normal. There is no abdominal tenderness. Musculoskeletal:         General: No tenderness, deformity or edema. Neurological: He is alert and oriented to person, place, and time. Skin: Skin is warm and dry. Psychiatric: He has a normal mood and affect. His behavior is normal. Thought content normal.      EKG: Suspected limb lead reversal, atrial fibrillation, right axis deviation, no ST-T wave changes concerning for ischemia. Compared to the previous EKG atrial fibrillation has replaced sinus rhythm, axis is shifted to the right     ASSESSMENT and PLAN     Paroxysmal atrial fibrillation. Status post atrial fibrillation ablation 2007. Patient is back in a rate controlled atrial fibrillation today. My suspicion is this may have been present on and off for the past 6 to 9 months given his symptom onset. I have recommended a trial of sinus rhythm with cardioversion after he has been anticoagulated for a total of 4 weeks.      Dyslipidemia. Patient primarily has elevated triglycerides and he has been managed with fenofibrate and krill oil by his PCP.     Hypothyroidism. Patient is been on a stable dose of Synthroid for many years now. Patient scheduled to have repeat blood work next week.     Obstructive sleep apnea. On nightly CPAP.       Patient has remains  On Xarelto for the past 7 weeks. Echocardiogram demonstrated normal LV function and upper normal LA size. Labs today demonstrate normal electrolytes.  Will plan on elective cardioversion today as scheduled.

## 2020-09-24 ENCOUNTER — OFFICE VISIT (OUTPATIENT)
Dept: CARDIOLOGY CLINIC | Age: 60
End: 2020-09-24
Payer: COMMERCIAL

## 2020-09-24 VITALS
DIASTOLIC BLOOD PRESSURE: 84 MMHG | HEART RATE: 63 BPM | SYSTOLIC BLOOD PRESSURE: 120 MMHG | WEIGHT: 281 LBS | HEIGHT: 75 IN | OXYGEN SATURATION: 98 % | BODY MASS INDEX: 34.94 KG/M2

## 2020-09-24 DIAGNOSIS — I48.0 PAROXYSMAL ATRIAL FIBRILLATION (HCC): Primary | ICD-10-CM

## 2020-09-24 DIAGNOSIS — E66.01 SEVERE OBESITY (HCC): ICD-10-CM

## 2020-09-24 DIAGNOSIS — E78.5 DYSLIPIDEMIA: ICD-10-CM

## 2020-09-24 PROCEDURE — 99213 OFFICE O/P EST LOW 20 MIN: CPT | Performed by: NURSE PRACTITIONER

## 2020-09-24 NOTE — PATIENT INSTRUCTIONS
Refer to Dr. July Gallardo re: recurrent symptomatic AFIB (s/p cardioversion on 9/17/20) Follow-up with Dr. Magy Malcolm as scheduled in late Oct 2020

## 2020-09-24 NOTE — PROGRESS NOTES
Win Harrison presents today for   Chief Complaint   Patient presents with   [facility] Follow Up     s/p Cardioversion     Shortness of Breath    Dizziness    Leg Swelling       Win Harrison preferred language for health care discussion is english/other. Is someone accompanying this pt? no    Is the patient using any DME equipment during [address]? no    Depression Screening:  3 most recent PHQ Screens 8/24/2020   Little interest or pleasure in doing things Not at all   Feeling down, depressed, irritable, or hopeless Not at all   Total Score PHQ 2 0       Learning Assessment:  No flowsheet data found. Abuse Screening:  Abuse Screening Questionnaire 8/24/2020   Do you ever feel afraid of your partner? N   Are you in a relationship with someone who physically or mentally threatens you? N   Is it safe for you to go home? Y       Fall Risk  Fall Risk Assessment, last 12 mths 8/24/2020   Able to walk? Yes   Fall in past 12 months? No       Pt currently taking Anticoagulant therapy? yes    Coordination of Care:  1. Have you been to the ER, urgent care clinic since your last visit? Hospitalized since your last visit? yes    2. Have you seen or consulted any other health care providers outside of the [address] [place] [address] since your last visit? Include any pap smears or colon screening.  no

## 2020-10-01 NOTE — PROGRESS NOTES
I have personally seen and evaluated the device findings. Interrogation reviewed and I agree with assessment.     Dequan Palomo

## 2020-10-07 ENCOUNTER — TRANSCRIBE ORDER (OUTPATIENT)
Dept: CARDIAC CATH/INVASIVE PROCEDURES | Age: 60
End: 2020-10-07

## 2020-10-07 ENCOUNTER — OFFICE VISIT (OUTPATIENT)
Dept: CARDIOLOGY CLINIC | Age: 60
End: 2020-10-07
Payer: COMMERCIAL

## 2020-10-07 VITALS
OXYGEN SATURATION: 97 % | SYSTOLIC BLOOD PRESSURE: 140 MMHG | DIASTOLIC BLOOD PRESSURE: 80 MMHG | WEIGHT: 280 LBS | HEART RATE: 68 BPM | BODY MASS INDEX: 34.82 KG/M2 | HEIGHT: 75 IN

## 2020-10-07 DIAGNOSIS — Z98.890 STATUS POST CATHETER ABLATION OF ATRIAL FIBRILLATION: ICD-10-CM

## 2020-10-07 DIAGNOSIS — G47.33 OBSTRUCTIVE SLEEP APNEA SYNDROME: ICD-10-CM

## 2020-10-07 DIAGNOSIS — E03.9 HYPOTHYROIDISM, UNSPECIFIED TYPE: ICD-10-CM

## 2020-10-07 DIAGNOSIS — I48.0 PAROXYSMAL ATRIAL FIBRILLATION (HCC): Primary | ICD-10-CM

## 2020-10-07 DIAGNOSIS — E78.5 DYSLIPIDEMIA: ICD-10-CM

## 2020-10-07 DIAGNOSIS — I48.0 PAROXYSMAL ATRIAL FIBRILLATION (HCC): ICD-10-CM

## 2020-10-07 DIAGNOSIS — I48.91 A-FIB (HCC): Primary | ICD-10-CM

## 2020-10-07 DIAGNOSIS — E66.01 SEVERE OBESITY (HCC): ICD-10-CM

## 2020-10-07 PROCEDURE — 99205 OFFICE O/P NEW HI 60 MIN: CPT | Performed by: INTERNAL MEDICINE

## 2020-10-07 PROCEDURE — 93000 ELECTROCARDIOGRAM COMPLETE: CPT | Performed by: INTERNAL MEDICINE

## 2020-10-07 NOTE — PROGRESS NOTES
Carol Giron presents today for   Chief Complaint   Patient presents with    Follow-up     2 month follow up after having a cardioversion on 09-17-20       Carol Giron preferred language for health care discussion is english/other. Is someone accompanying this pt? no    Is the patient using any DME equipment during [address]? no    Depression Screening:  3 most recent PHQ Screens 10/7/2020   Little interest or pleasure in doing things Not at all   Feeling down, depressed, irritable, or hopeless Not at all   Total Score PHQ 2 0       Learning Assessment:  Learning Assessment 10/7/2020   PRIMARY LEARNER Patient   PRIMARY LANGUAGE ENGLISH   LEARNER PREFERENCE PRIMARY DEMONSTRATION   ANSWERED BY patient   RELATIONSHIP SELF       Abuse Screening:  Abuse Screening Questionnaire 10/7/2020   Do you ever feel afraid of your partner? N   Are you in a relationship with someone who physically or mentally threatens you? N   Is it safe for you to go home? Y       Fall Risk  Fall Risk Assessment, last 12 mths 10/7/2020   Able to walk? Yes   Fall in past 12 months? No       Pt currently taking Anticoagulant therapy? Xarelto 20 mg once a day    Coordination of Care:  1. Have you been to the ER, urgent care clinic since your last visit? Hospitalized since your last visit? no    2. Have you seen or consulted any other health care providers outside of the [address] [place] [address] since your last visit? Include any pap smears or colon screening.  no

## 2020-10-07 NOTE — PROGRESS NOTES
HISTORY OF PRESENT ILLNESS:  68-year-old male, referred by Dr. Jd Whitfield for evaluation for redo atrial fibrillation/ablation. He had a radiofrequency ablation by Dr. Gill Smart back in 2007. He also had ablating performed around the SVC, as well as a flutter line. He had been doing well up till about six to nine months ago, when he started noting some dizziness and fatigue similar to his previous atrial fibrillation. He had a trial of cardioversion in September, only lasting a few days. He would like to avoid antiarrhythmic medicine if possible. He does have sleep apnea, which he uses CPAP for regularly. Echocardiogram last month showed normal function. He is here to discuss options. IMPRESSION:  1. Paroxysmal, now more persistent atrial fibrillation, status post trial of cardioversion in September, 2020. He felt better for a while, but it quickly recurred. 2. Pulmonary vein isolation with radiofrequency by Dr. Gill Smart back in 2007, as well as SVC ablation and flutter line on the right. 3. Chronic Xarelto use. 4. Desire to avoid long term antiarrhythmics. 5. Dyslipidemia. 6. History of Crohn's disease. 7. Sleep apnea, on CPAP. PLAN:   I discussed risks, benefits and alternatives to atrial fibrillation ablation redo. He would like to proceed and avoid antiarrhythmics if possible. I discussed that if this recurs, then it may be reasonable to pursue epicardial ablation and possible trial of antiarrythmic, either Tikosyn or Sotalol loading while he is in the hospital.  At this point we will simply pursue redo pulmonary vein insolation. He will need CT scan and to hold Xarelto 48 hours prior. Past Medical History:   Diagnosis Date    Cro[facility] disease ([facility] [address]     Dyslipidemia     History of cardiovascular stress test 09/2015    Normal stress echocardiogram    History of dizziness     Likely from autonomic insufficiency. Occasional brief loss of vision (10-15 secs).     History of echocardiogram 11/07/2012    EF 55-60%. No WMA. Normal RVSP/PASP.  Paroxysmal atrial fibrillation ([address] 2007    s/p afib ablation    S/P ablation of atrial fibrillation 2007    Sleep apnea 2005-6    CPAP at night       Current Outpatient Medications   Medication Sig Dispense Refill    synthroid 50 mcg tablet       fenofibrate nanocrystallized (TRICOR) 145 mg tablet       krill oil 500 mg cap Take  by mouth.  multivitamin (ONE A DAY) tablet Take 1 Tab by mouth daily.  rivaroxaban (Xarelto) 20 mg tab tablet Take 1 Tab by mouth daily. 30 Tab 6    naproxen sodium (Aleve) 220 mg tablet Take 220 mg by mouth as needed. Social History   reports that he has never smoked. He has never used smokeless tobacco.   reports no history of alcohol use. Family History  family history is not on file. Review of Systems  Except as stated above include:  Constitutional: Negative for fever, chills and malaise/fatigue. HEENT: No congestion or recent URI. Gastrointestinal: No nausea, vomiting, abdominal pain, bloody stools. Pulmonary:  Negative except as stated above. Cardiac:  Negative except as stated above. Musculoskeletal: Negative except as stated above. Neurological:  No localized symptoms. Skin:  Negative except as stated above. Psych:  Negative except as stated above. Endocrine:  Negative except as stated above. PHYSICAL EXAM  BP Readings from Last 3 Encounters:   10/07/20 (!) 140/80   09/24/20 120/84   09/17/20 (!) 153/95     Pulse Readings from Last 3 Encounters:   10/07/20 68   09/24/20 63   09/17/20 68     Wt Readings from Last 3 Encounters:   10/07/20 127 kg (280 lb)   09/24/20 127.5 kg (281 lb)   09/17/20 127 kg (280 lb)     General:   Well developed, well groomed. Head/Neck:   No jugular venous distention     No carotid bruits. No evidence of xanthelasma. Lungs:   No respiratory distress. Clear bilaterally. Heart:    Irreg irreg. Normal S1/S2.       Palpation of heart with normal point of maximum impulse. No significant murmurs, rubs or gallops. Abdomen:   Soft and nontender. No palpable abdominal mass or bruits. Extremities:   Intact peripheral pulses. No significant edema. Neurological:   Alert and oriented to person, place, time. No focal neurological deficit visually. Skin:   No obvious rash    Blood Pressure Metric:  Monitor recommended and adjustments stated if needed.

## 2020-10-07 NOTE — LETTER
10/7/2020 9:13 AM 
 
 
 
Presley Barnes 
[id number] 
1960 Insurance:  [address] No Auth Needed Proc Date: Tues. 10/27                Proc Time:  8:00am 
 
Performing MD : Dr. Kaley Lincoln                      Procedure:MARIAH/AFib Ablation Hospital:  [facility]                                            PCP Dr. Anibal Lewis Scheduled with:  Tiarra/EMail                                                        Date:10/7/2020 HP: 10/7      EKG: ______    Labs:______  CXR: _______  Orders:  10/7 Special Instructions:  _____________________________________________________ 
______________________________________________________________________ 
______________________________________________________________________ Date Faxed:   ______________   Pages Faxed: ___________________ The materials enclosed with this facsimile transmission are private and confidential and are the property of the sender. If you are not the intended recipient, be advised that any unauthorized use, disclosure, copying, distribution, or the taking of any action in reliance on the contents of this telecopied information is strictly prohibited. If you have received this in error, please immediately notify the sender via telephone to arrange for return of the forwarded documentation.

## 2020-10-07 NOTE — PATIENT INSTRUCTIONS
[facility] Patient  EP Instructions 1. You are scheduled to have a Afib Ablation on  October 27, 2020  , at 0800 am.    Please check in at 0700 am. 
 
2. Please go to [facility] and park in the outpatient parking lot that is located around to the back of the hospital and enter through the [employer]. Once you enter through the [facility] check in with the  there. The  will either give you directions or assist you in getting to the cath holding area. 3.  You are not to eat or drink anything after midnight the night before your  procedure. 4. Please continue to take your medications with a small sip of water on the morning of the procedure with the following exceptions: Do not take Xarelto 48 hours prior to procedure. 5. If you are diabetic, do not take your insulin/sugar pill the morning of the procedure. 6. We encourage families to wait in the waiting room on the first floor while the procedure is being done. The Doctor will come out and talk with you as soon as the procedure is over. 7. There is the possibility that you may spend the night in the hospital, depending on the results of the procedure. This will be determined after the procedure is done. 8.   If you or your family have any questions, please call our office Monday-Friday 9:00am  
      -4:30 pm , at [phone], and ask to speak to one of the nurses.

## 2020-10-15 ENCOUNTER — HOSPITAL ENCOUNTER (OUTPATIENT)
Dept: CT IMAGING | Age: 60
Discharge: HOME | End: 2020-10-15
Attending: INTERNAL MEDICINE
Payer: COMMERCIAL

## 2020-10-15 LAB — CREAT UR-MCNC: 1.3 MG/DL (ref 0.6–1.3)

## 2020-10-15 PROCEDURE — 74011000636 HC RX REV CODE- 636: Performed by: INTERNAL MEDICINE

## 2020-10-15 PROCEDURE — 75572 CT HRT W/3D IMAGE: CPT

## 2020-10-15 PROCEDURE — 82565 ASSAY OF CREATININE: CPT

## 2020-10-15 RX ADMIN — IOPAMIDOL 150 ML: 755 INJECTION, SOLUTION INTRAVENOUS at 14:00

## 2020-10-16 NOTE — PROGRESS NOTES
Per your last note \"I discussed risks, benefits and alternatives to atrial fibrillation ablation redo. He would like to proceed and avoid antiarrhythmics if possible. I discussed that if this recurs, then it may be reasonable to pursue epicardial ablation and possible trial of antiarrythmic, either Tikosyn or Sotalol loading while he is in the hospital.  At this point we will simply pursue redo pulmonary vein insolation. He will need CT scan and to hold Xarelto 48 hours prior.

## 2020-10-22 ENCOUNTER — HOSPITAL ENCOUNTER (OUTPATIENT)
Dept: PREADMISSION TESTING | Age: 60
Discharge: HOME | End: 2020-10-22
Payer: COMMERCIAL

## 2020-10-22 DIAGNOSIS — I48.0 PAROXYSMAL ATRIAL FIBRILLATION (HCC): ICD-10-CM

## 2020-10-22 LAB
ALBUMIN SERPL-MCNC: 3.9 G/DL (ref 3.4–5)
ALBUMIN/GLOB SERPL: 1.5 {RATIO} (ref 0.8–1.7)
ALP SERPL-CCNC: 47 U/L (ref 45–117)
ALT SERPL-CCNC: 27 U/L (ref 16–61)
ANION GAP SERPL CALC-SCNC: 5 MMOL/L (ref 3–18)
AST SERPL-CCNC: 16 U/L (ref 10–38)
BASOPHILS # BLD: 0 K/UL (ref 0–0.1)
BASOPHILS NFR BLD: 1 % (ref 0–2)
BILIRUB SERPL-MCNC: 0.3 MG/DL (ref 0.2–1)
BUN SERPL-MCNC: 27 MG/DL (ref 7–18)
BUN/CREAT SERPL: 24 (ref 12–20)
CALCIUM SERPL-MCNC: 9 MG/DL (ref 8.5–10.1)
CHLORIDE SERPL-SCNC: 110 MMOL/L (ref 100–111)
CO2 SERPL-SCNC: 27 MMOL/L (ref 21–32)
CREAT SERPL-MCNC: 1.11 MG/DL (ref 0.6–1.3)
DIFFERENTIAL METHOD BLD: ABNORMAL
EOSINOPHIL # BLD: 0.3 K/UL (ref 0–0.4)
EOSINOPHIL NFR BLD: 6 % (ref 0–5)
ERYTHROCYTE [DISTWIDTH] IN BLOOD BY AUTOMATED COUNT: 13.1 % (ref 11.6–14.5)
GLOBULIN SER CALC-MCNC: 2.6 G/DL (ref 2–4)
GLUCOSE SERPL-MCNC: 89 MG/DL (ref 74–99)
HCT VFR BLD AUTO: 44.8 % (ref 36–48)
HGB BLD-MCNC: 14.8 G/DL (ref 13–16)
INR PPP: 1.2 (ref 0.8–1.2)
LYMPHOCYTES # BLD: 1.3 K/UL (ref 0.9–3.6)
LYMPHOCYTES NFR BLD: 23 % (ref 21–52)
MCH RBC QN AUTO: 30.2 PG (ref 24–34)
MCHC RBC AUTO-ENTMCNC: 33 G/DL (ref 31–37)
MCV RBC AUTO: 91.4 FL (ref 74–97)
MONOCYTES # BLD: 0.5 K/UL (ref 0.05–1.2)
MONOCYTES NFR BLD: 9 % (ref 3–10)
NEUTS SEG # BLD: 3.3 K/UL (ref 1.8–8)
NEUTS SEG NFR BLD: 61 % (ref 40–73)
PLATELET # BLD AUTO: 200 K/UL (ref 135–420)
PMV BLD AUTO: 10.4 FL (ref 9.2–11.8)
POTASSIUM SERPL-SCNC: 4.3 MMOL/L (ref 3.5–5.5)
PROT SERPL-MCNC: 6.5 G/DL (ref 6.4–8.2)
PROTHROMBIN TIME: 15.2 SEC (ref 11.5–15.2)
RBC # BLD AUTO: 4.9 M/UL (ref 4.7–5.5)
SODIUM SERPL-SCNC: 142 MMOL/L (ref 136–145)
WBC # BLD AUTO: 5.4 K/UL (ref 4.6–13.2)

## 2020-10-22 PROCEDURE — 87635 SARS-COV-2 COVID-19 AMP PRB: CPT

## 2020-10-22 PROCEDURE — 80053 COMPREHEN METABOLIC PANEL: CPT

## 2020-10-22 PROCEDURE — 36415 COLL VENOUS BLD VENIPUNCTURE: CPT

## 2020-10-22 PROCEDURE — 85610 PROTHROMBIN TIME: CPT

## 2020-10-22 PROCEDURE — 85025 COMPLETE CBC W/AUTO DIFF WBC: CPT

## 2020-10-24 LAB — SARS-COV-2, COV2NT: NOT DETECTED

## 2020-10-26 NOTE — H&P
Plan MARIAH with EP study and A.fib ablation today as previously discussed. Some patients may still require anti-arrhythmic drug therapy following ablation, and success is defined as afib free from months 3 to 12 after the ablation. The first 3 months is considered \"window period\" for ablation edema to subside and tissue healing, hence, episodes of afib during this period is not considered procedure failure. I have discussed indications, procedures, risks, limitations, and follow up associated with transesophageal echo, electrophysiology study, intracardiac echo, transseptal heart cath, intracardiac mapping, and catheter ablation for atrial fibrillation. Risks included acute respiratory failure, neurovascular injury, soft tissue injury, infection, bleeding, DVT, radiation exposure, atrial septal defect, iv contrast nephropathy, IV contrast allergic reaction, atrial septal defect, perforation, tamponade, potential need for emergent heart surgery, pulmonary vein stenosis that may or may not be amenable to stent placement, phrenic nerve injury/diaphragmatic paralysis that may or may not recover with time, heart block and need for permanent pacing, esophageal injury, dysmotility, or death associated with atrial-esophageal fistula formation, MI, CVA, and death. Reviewed that some of these complications may not be apparent at the time of the procedure. The patient acknowledged these risks and would like to proceed. HISTORY OF PRESENT ILLNESS:  56-year-old male, referred by Dr. Susan Feliz for evaluation for redo atrial fibrillation/ablation. He had a radiofrequency ablation by Dr. Delvis Roberts back in 2007. He also had ablating performed around the SVC, as well as a flutter line. He had been doing well up till about six to nine months ago, when he started noting some dizziness and fatigue similar to his previous atrial fibrillation. He had a trial of cardioversion in September, only lasting a few days.   He would like to avoid antiarrhythmic medicine if possible. He does have sleep apnea, which he uses CPAP for regularly. Echocardiogram last month showed normal function. He is here to discuss options.      IMPRESSION:  1. Paroxysmal, now more persistent atrial fibrillation, status post trial of cardioversion in September, 2020. He felt better for a while, but it quickly recurred. 2. Pulmonary vein isolation with radiofrequency by Dr. Neri Emerson back in 2007, as well as SVC ablation and flutter line on the right. 3. Chronic Xarelto use. 4. Desire to avoid long term antiarrhythmics. 5. Dyslipidemia. 6. History of Crohn's disease. 7. Sleep apnea, on CPAP.     PLAN:   I discussed risks, benefits and alternatives to atrial fibrillation ablation redo. He would like to proceed and avoid antiarrhythmics if possible. I discussed that if this recurs, then it may be reasonable to pursue epicardial ablation and possible trial of antiarrythmic, either Tikosyn or Sotalol loading while he is in the hospital.  At this point we will simply pursue redo pulmonary vein insolation. He will need CT scan and to hold Xarelto 48 hours prior.             Past Medical History:   Diagnosis Date    Crohn disease ([facility] [address]      Dyslipidemia      History of cardiovascular stress test 09/2015     Normal stress echocardiogram    History of dizziness       Likely from autonomic insufficiency. Occasional brief loss of vision (10-15 secs).  History of echocardiogram 11/07/2012     EF 55-60%. No WMA. Normal RVSP/PASP.  Paroxysmal atrial fibrillation ([address] 2007     s/p afib ablation    S/P ablation of atrial fibrillation 2007    Sleep apnea 2005-6     CPAP at night               Current Outpatient Medications   Medication Sig Dispense Refill    synthroid 50 mcg tablet          fenofibrate nanocrystallized (TRICOR) 145 mg tablet          krill oil 500 mg cap Take  by mouth.        multivitamin (ONE A DAY) tablet Take 1 Tab by mouth daily.        rivaroxaban (Xarelto) 20 mg tab tablet Take 1 Tab by mouth daily. 30 Tab 6    naproxen sodium (Aleve) 220 mg tablet Take 220 mg by mouth as needed.            Social History   reports that he has never smoked. He has never used smokeless tobacco.   reports no history of alcohol use.     Family History  family history is not on file.     Review of Systems  Except as stated above include:  Constitutional: Negative for fever, chills and malaise/fatigue. HEENT: No congestion or recent URI. Gastrointestinal: No nausea, vomiting, abdominal pain, bloody stools. Pulmonary:  Negative except as stated above. Cardiac:  Negative except as stated above. Musculoskeletal: Negative except as stated above. Neurological:  No localized symptoms. Skin:  Negative except as stated above. Psych:  Negative except as stated above. Endocrine:  Negative except as stated above.     PHYSICAL EXAM      BP Readings from Last 3 Encounters:   10/07/20 (!) 140/80   09/24/20 120/84   09/17/20 (!) 153/95         Pulse Readings from Last 3 Encounters:   10/07/20 68   09/24/20 63   09/17/20 68         Wt Readings from Last 3 Encounters:   10/07/20 127 kg (280 lb)   09/24/20 127.5 kg (281 lb)   09/17/20 127 kg (280 lb)     General:          Well developed, well groomed. Head/Neck:     No jugular venous distention                           No carotid bruits. No evidence of xanthelasma. Lungs:             No respiratory distress. Clear bilaterally. Heart:                          Irreg irreg. Normal S1/S2. Palpation of heart with normal point of maximum impulse. No significant murmurs, rubs or gallops. Abdomen:        Soft and nontender. No palpable abdominal mass or bruits. Extremities:     Intact peripheral pulses. No significant edema.     Neurological:   Alert and oriented to person, place, time. No focal neurological deficit visually.   Skin:                No obvious rash     Blood Pressure Metric:  Monitor recommended and adjustments stated if needed.         Electronically signed by Louisa Ayala MD at 10/07/20 1561

## 2020-10-27 ENCOUNTER — HOSPITAL ENCOUNTER (OUTPATIENT)
Age: 60
Setting detail: OBSERVATION
Discharge: HOME | End: 2020-10-28
Attending: INTERNAL MEDICINE | Admitting: INTERNAL MEDICINE
Payer: COMMERCIAL

## 2020-10-27 ENCOUNTER — HOSPITAL ENCOUNTER (OUTPATIENT)
Dept: NON INVASIVE DIAGNOSTICS | Age: 60
Discharge: HOME | End: 2020-10-27
Payer: COMMERCIAL

## 2020-10-27 ENCOUNTER — ANESTHESIA EVENT (OUTPATIENT)
Dept: CARDIAC CATH/INVASIVE PROCEDURES | Age: 60
End: 2020-10-27
Payer: COMMERCIAL

## 2020-10-27 ENCOUNTER — ANESTHESIA (OUTPATIENT)
Dept: CARDIAC CATH/INVASIVE PROCEDURES | Age: 60
End: 2020-10-27
Payer: COMMERCIAL

## 2020-10-27 VITALS
SYSTOLIC BLOOD PRESSURE: 140 MMHG | HEIGHT: 76 IN | WEIGHT: 280 LBS | DIASTOLIC BLOOD PRESSURE: 80 MMHG | BODY MASS INDEX: 34.1 KG/M2

## 2020-10-27 DIAGNOSIS — I48.0 PAROXYSMAL ATRIAL FIBRILLATION (HCC): ICD-10-CM

## 2020-10-27 DIAGNOSIS — I48.91 A-FIB (HCC): ICD-10-CM

## 2020-10-27 LAB
ACT BLD: 164 SECS (ref 79–138)
ACT BLD: 169 SECS (ref 79–138)
ACT BLD: 191 SECS (ref 79–138)
ACT BLD: 202 SECS (ref 79–138)
ACT BLD: 241 SECS (ref 79–138)
ACT BLD: 268 SECS (ref 79–138)
ACT BLD: 307 SECS (ref 79–138)
ATRIAL RATE: 84 BPM
BUN BLD-MCNC: 24 MG/DL (ref 7–18)
CALCULATED P AXIS, ECG09: 73 DEGREES
CALCULATED R AXIS, ECG10: 61 DEGREES
CALCULATED T AXIS, ECG11: 76 DEGREES
CHLORIDE BLD-SCNC: 104 MMOL/L (ref 100–108)
CREAT UR-MCNC: 1 MG/DL (ref 0.6–1.3)
DIAGNOSIS, 93000: NORMAL
GLUCOSE BLD STRIP.AUTO-MCNC: 92 MG/DL (ref 74–106)
HCT VFR BLD CALC: 45 % (ref 36–49)
HGB BLD-MCNC: 15.3 G/DL (ref 12–16)
INR BLD: 1.2 (ref 0.9–1.2)
P-R INTERVAL, ECG05: 244 MS
POTASSIUM BLD-SCNC: 4.3 MMOL/L (ref 3.5–5.5)
Q-T INTERVAL, ECG07: 404 MS
QRS DURATION, ECG06: 104 MS
QTC CALCULATION (BEZET), ECG08: 477 MS
SODIUM BLD-SCNC: 141 MMOL/L (ref 136–145)
VENTRICULAR RATE, ECG03: 84 BPM

## 2020-10-27 PROCEDURE — 74011250636 HC RX REV CODE- 250/636

## 2020-10-27 PROCEDURE — 77030002996 HC SUT SLK J&J -A: Performed by: INTERNAL MEDICINE

## 2020-10-27 PROCEDURE — 82947 ASSAY GLUCOSE BLOOD QUANT: CPT

## 2020-10-27 PROCEDURE — 76060000037 HC ANESTHESIA 3 TO 3.5 HR: Performed by: INTERNAL MEDICINE

## 2020-10-27 PROCEDURE — C1730 CATH, EP, 19 OR FEW ELECT: HCPCS | Performed by: INTERNAL MEDICINE

## 2020-10-27 PROCEDURE — 99218 HC RM OBSERVATION: CPT

## 2020-10-27 PROCEDURE — C1894 INTRO/SHEATH, NON-LASER: HCPCS | Performed by: INTERNAL MEDICINE

## 2020-10-27 PROCEDURE — 77030030261 HC CBL UMB ELEC DISP MEDT -C: Performed by: INTERNAL MEDICINE

## 2020-10-27 PROCEDURE — 74011250636 HC RX REV CODE- 250/636: Performed by: ANESTHESIOLOGY

## 2020-10-27 PROCEDURE — 77030019896 HC KT ARTERIAL LN TELE -B: Performed by: INTERNAL MEDICINE

## 2020-10-27 PROCEDURE — 74011000258 HC RX REV CODE- 258: Performed by: ANESTHESIOLOGY

## 2020-10-27 PROCEDURE — C1893 INTRO/SHEATH, FIXED,NON-PEEL: HCPCS | Performed by: INTERNAL MEDICINE

## 2020-10-27 PROCEDURE — 74011250636 HC RX REV CODE- 250/636: Performed by: INTERNAL MEDICINE

## 2020-10-27 PROCEDURE — C1769 GUIDE WIRE: HCPCS | Performed by: INTERNAL MEDICINE

## 2020-10-27 PROCEDURE — 74011000250 HC RX REV CODE- 250: Performed by: INTERNAL MEDICINE

## 2020-10-27 PROCEDURE — 77030013797 HC KT TRNSDUC PRSSR EDWD -A: Performed by: INTERNAL MEDICINE

## 2020-10-27 PROCEDURE — 93621 COMP EP EVL L PAC&REC C SINS: CPT | Performed by: INTERNAL MEDICINE

## 2020-10-27 PROCEDURE — C1759 CATH, INTRA ECHOCARDIOGRAPHY: HCPCS | Performed by: INTERNAL MEDICINE

## 2020-10-27 PROCEDURE — 77030035291 HC TBNG PMP SMARTABLATE J&J -B: Performed by: INTERNAL MEDICINE

## 2020-10-27 PROCEDURE — 2709999900 HC NON-CHARGEABLE SUPPLY: Performed by: INTERNAL MEDICINE

## 2020-10-27 PROCEDURE — 77030030278 HC COAX UMB DISP MEDT -C: Performed by: INTERNAL MEDICINE

## 2020-10-27 PROCEDURE — 93656 COMPRE EP EVAL ABLTJ ATR FIB: CPT | Performed by: INTERNAL MEDICINE

## 2020-10-27 PROCEDURE — 74011000250 HC RX REV CODE- 250: Performed by: ANESTHESIOLOGY

## 2020-10-27 PROCEDURE — 85610 PROTHROMBIN TIME: CPT

## 2020-10-27 PROCEDURE — 82565 ASSAY OF CREATININE: CPT

## 2020-10-27 PROCEDURE — 74011250637 HC RX REV CODE- 250/637: Performed by: INTERNAL MEDICINE

## 2020-10-27 PROCEDURE — 77030029163 HC CBL EP DX ACHV DISP MEDT -C: Performed by: INTERNAL MEDICINE

## 2020-10-27 PROCEDURE — 00537 ANES CARDIAC EP PROCEDURES: CPT | Performed by: ANESTHESIOLOGY

## 2020-10-27 PROCEDURE — 77030018729 HC ELECTRD DEFIB PAD CARD -B: Performed by: INTERNAL MEDICINE

## 2020-10-27 PROCEDURE — 93325 DOPPLER ECHO COLOR FLOW MAPG: CPT

## 2020-10-27 PROCEDURE — 93653 COMPRE EP EVAL TX SVT: CPT | Performed by: INTERNAL MEDICINE

## 2020-10-27 PROCEDURE — 74011000636 HC RX REV CODE- 636: Performed by: INTERNAL MEDICINE

## 2020-10-27 PROCEDURE — 93005 ELECTROCARDIOGRAM TRACING: CPT

## 2020-10-27 PROCEDURE — C1733 CATH, EP, OTHR THAN COOL-TIP: HCPCS | Performed by: INTERNAL MEDICINE

## 2020-10-27 PROCEDURE — 93662 INTRACARDIAC ECG (ICE): CPT

## 2020-10-27 PROCEDURE — 77030020506 HC NDL TRNSPTL NRG BAYL -F: Performed by: INTERNAL MEDICINE

## 2020-10-27 PROCEDURE — 93620 COMP EP EVL R AT VEN PAC&REC: CPT | Performed by: INTERNAL MEDICINE

## 2020-10-27 PROCEDURE — 85347 COAGULATION TIME ACTIVATED: CPT

## 2020-10-27 PROCEDURE — 74011250637 HC RX REV CODE- 250/637: Performed by: NURSE ANESTHETIST, CERTIFIED REGISTERED

## 2020-10-27 RX ORDER — PROTAMINE SULFATE 10 MG/ML
INJECTION, SOLUTION INTRAVENOUS AS NEEDED
Status: DISCONTINUED | OUTPATIENT
Start: 2020-10-27 | End: 2020-10-27 | Stop reason: HOSPADM

## 2020-10-27 RX ORDER — ONDANSETRON 2 MG/ML
INJECTION INTRAMUSCULAR; INTRAVENOUS AS NEEDED
Status: DISCONTINUED | OUTPATIENT
Start: 2020-10-27 | End: 2020-10-27 | Stop reason: HOSPADM

## 2020-10-27 RX ORDER — SUCCINYLCHOLINE CHLORIDE 20 MG/ML
INJECTION INTRAMUSCULAR; INTRAVENOUS AS NEEDED
Status: DISCONTINUED | OUTPATIENT
Start: 2020-10-27 | End: 2020-10-27 | Stop reason: HOSPADM

## 2020-10-27 RX ORDER — HEPARIN SODIUM 1000 [USP'U]/ML
INJECTION, SOLUTION INTRAVENOUS; SUBCUTANEOUS AS NEEDED
Status: DISCONTINUED | OUTPATIENT
Start: 2020-10-27 | End: 2020-10-27 | Stop reason: HOSPADM

## 2020-10-27 RX ORDER — LEVOTHYROXINE SODIUM 50 UG/1
50 TABLET ORAL DAILY
Status: DISCONTINUED | OUTPATIENT
Start: 2020-10-28 | End: 2020-10-28 | Stop reason: HOSPADM

## 2020-10-27 RX ORDER — SODIUM CHLORIDE, SODIUM LACTATE, POTASSIUM CHLORIDE, CALCIUM CHLORIDE 600; 310; 30; 20 MG/100ML; MG/100ML; MG/100ML; MG/100ML
INJECTION, SOLUTION INTRAVENOUS
Status: DISCONTINUED | OUTPATIENT
Start: 2020-10-27 | End: 2020-10-27 | Stop reason: HOSPADM

## 2020-10-27 RX ORDER — PROPOFOL 10 MG/ML
INJECTION, EMULSION INTRAVENOUS AS NEEDED
Status: DISCONTINUED | OUTPATIENT
Start: 2020-10-27 | End: 2020-10-27 | Stop reason: HOSPADM

## 2020-10-27 RX ORDER — PANTOPRAZOLE SODIUM 40 MG/1
40 TABLET, DELAYED RELEASE ORAL
Status: DISCONTINUED | OUTPATIENT
Start: 2020-10-28 | End: 2020-10-28 | Stop reason: HOSPADM

## 2020-10-27 RX ORDER — LIDOCAINE HYDROCHLORIDE 10 MG/ML
0.1 INJECTION, SOLUTION EPIDURAL; INFILTRATION; INTRACAUDAL; PERINEURAL AS NEEDED
Status: DISCONTINUED | OUTPATIENT
Start: 2020-10-27 | End: 2020-10-28

## 2020-10-27 RX ORDER — FENTANYL CITRATE 50 UG/ML
INJECTION, SOLUTION INTRAMUSCULAR; INTRAVENOUS AS NEEDED
Status: DISCONTINUED | OUTPATIENT
Start: 2020-10-27 | End: 2020-10-27 | Stop reason: HOSPADM

## 2020-10-27 RX ORDER — FENTANYL CITRATE 50 UG/ML
50 INJECTION, SOLUTION INTRAMUSCULAR; INTRAVENOUS
Status: DISCONTINUED | OUTPATIENT
Start: 2020-10-27 | End: 2020-10-28

## 2020-10-27 RX ORDER — THERA TABS 400 MCG
1 TAB ORAL DAILY
Status: DISCONTINUED | OUTPATIENT
Start: 2020-10-28 | End: 2020-10-28 | Stop reason: HOSPADM

## 2020-10-27 RX ORDER — MIDAZOLAM HYDROCHLORIDE 1 MG/ML
INJECTION, SOLUTION INTRAMUSCULAR; INTRAVENOUS AS NEEDED
Status: DISCONTINUED | OUTPATIENT
Start: 2020-10-27 | End: 2020-10-27 | Stop reason: HOSPADM

## 2020-10-27 RX ORDER — ROCURONIUM BROMIDE 10 MG/ML
INJECTION, SOLUTION INTRAVENOUS AS NEEDED
Status: DISCONTINUED | OUTPATIENT
Start: 2020-10-27 | End: 2020-10-27 | Stop reason: HOSPADM

## 2020-10-27 RX ORDER — FENOFIBRATE 145 MG/1
145 TABLET, COATED ORAL DAILY
Status: DISCONTINUED | OUTPATIENT
Start: 2020-10-28 | End: 2020-10-28 | Stop reason: HOSPADM

## 2020-10-27 RX ORDER — EPHEDRINE SULFATE/0.9% NACL/PF 50 MG/5 ML
SYRINGE (ML) INTRAVENOUS AS NEEDED
Status: DISCONTINUED | OUTPATIENT
Start: 2020-10-27 | End: 2020-10-27 | Stop reason: HOSPADM

## 2020-10-27 RX ORDER — NEOSTIGMINE METHYLSULFATE 1 MG/ML
INJECTION, SOLUTION INTRAVENOUS AS NEEDED
Status: DISCONTINUED | OUTPATIENT
Start: 2020-10-27 | End: 2020-10-27 | Stop reason: HOSPADM

## 2020-10-27 RX ORDER — SODIUM CHLORIDE, SODIUM LACTATE, POTASSIUM CHLORIDE, CALCIUM CHLORIDE 600; 310; 30; 20 MG/100ML; MG/100ML; MG/100ML; MG/100ML
100 INJECTION, SOLUTION INTRAVENOUS CONTINUOUS
Status: DISCONTINUED | OUTPATIENT
Start: 2020-10-27 | End: 2020-10-28 | Stop reason: HOSPADM

## 2020-10-27 RX ORDER — SODIUM CHLORIDE 0.9 % (FLUSH) 0.9 %
5-40 SYRINGE (ML) INJECTION EVERY 8 HOURS
Status: DISCONTINUED | OUTPATIENT
Start: 2020-10-27 | End: 2020-10-28 | Stop reason: HOSPADM

## 2020-10-27 RX ORDER — FAMOTIDINE 20 MG/1
20 TABLET, FILM COATED ORAL ONCE
Status: COMPLETED | OUTPATIENT
Start: 2020-10-27 | End: 2020-10-27

## 2020-10-27 RX ORDER — OXYCODONE AND ACETAMINOPHEN 5; 325 MG/1; MG/1
1 TABLET ORAL
Status: DISCONTINUED | OUTPATIENT
Start: 2020-10-27 | End: 2020-10-28 | Stop reason: HOSPADM

## 2020-10-27 RX ORDER — SODIUM CHLORIDE 0.9 % (FLUSH) 0.9 %
5-40 SYRINGE (ML) INJECTION AS NEEDED
Status: DISCONTINUED | OUTPATIENT
Start: 2020-10-27 | End: 2020-10-28 | Stop reason: HOSPADM

## 2020-10-27 RX ORDER — LIDOCAINE HYDROCHLORIDE 20 MG/ML
INJECTION, SOLUTION EPIDURAL; INFILTRATION; INTRACAUDAL; PERINEURAL AS NEEDED
Status: DISCONTINUED | OUTPATIENT
Start: 2020-10-27 | End: 2020-10-27 | Stop reason: HOSPADM

## 2020-10-27 RX ORDER — SODIUM CHLORIDE, SODIUM LACTATE, POTASSIUM CHLORIDE, CALCIUM CHLORIDE 600; 310; 30; 20 MG/100ML; MG/100ML; MG/100ML; MG/100ML
75 INJECTION, SOLUTION INTRAVENOUS CONTINUOUS
Status: DISCONTINUED | OUTPATIENT
Start: 2020-10-27 | End: 2020-10-28

## 2020-10-27 RX ORDER — LIDOCAINE HYDROCHLORIDE 10 MG/ML
INJECTION, SOLUTION EPIDURAL; INFILTRATION; INTRACAUDAL; PERINEURAL AS NEEDED
Status: DISCONTINUED | OUTPATIENT
Start: 2020-10-27 | End: 2020-10-27 | Stop reason: HOSPADM

## 2020-10-27 RX ADMIN — PHENYLEPHRINE HYDROCHLORIDE 100 MCG: 10 INJECTION INTRAVENOUS at 08:54

## 2020-10-27 RX ADMIN — FENTANYL CITRATE 50 MCG: 50 INJECTION, SOLUTION INTRAMUSCULAR; INTRAVENOUS at 08:29

## 2020-10-27 RX ADMIN — Medication 10 MG: at 10:10

## 2020-10-27 RX ADMIN — PROTAMINE SULFATE 50 MG: 10 INJECTION, SOLUTION INTRAVENOUS at 11:09

## 2020-10-27 RX ADMIN — OXYCODONE HYDROCHLORIDE AND ACETAMINOPHEN 1 TABLET: 5; 325 TABLET ORAL at 13:54

## 2020-10-27 RX ADMIN — PHENYLEPHRINE HYDROCHLORIDE 100 MCG: 10 INJECTION INTRAVENOUS at 09:08

## 2020-10-27 RX ADMIN — SUCCINYLCHOLINE CHLORIDE 140 MG: 20 INJECTION, SOLUTION INTRAMUSCULAR; INTRAVENOUS at 08:32

## 2020-10-27 RX ADMIN — MIDAZOLAM HYDROCHLORIDE 1 MG: 2 INJECTION, SOLUTION INTRAMUSCULAR; INTRAVENOUS at 10:54

## 2020-10-27 RX ADMIN — HEPARIN SODIUM 18000 UNITS: 1000 INJECTION, SOLUTION INTRAVENOUS; SUBCUTANEOUS at 09:49

## 2020-10-27 RX ADMIN — MIDAZOLAM HYDROCHLORIDE 2 MG: 2 INJECTION, SOLUTION INTRAMUSCULAR; INTRAVENOUS at 08:23

## 2020-10-27 RX ADMIN — LIDOCAINE HYDROCHLORIDE 100 MG: 20 INJECTION, SOLUTION EPIDURAL; INFILTRATION; INTRACAUDAL; PERINEURAL at 08:32

## 2020-10-27 RX ADMIN — SODIUM CHLORIDE, SODIUM LACTATE, POTASSIUM CHLORIDE, AND CALCIUM CHLORIDE 100 ML/HR: 600; 310; 30; 20 INJECTION, SOLUTION INTRAVENOUS at 21:25

## 2020-10-27 RX ADMIN — PHENYLEPHRINE HYDROCHLORIDE 100 MCG: 10 INJECTION INTRAVENOUS at 09:36

## 2020-10-27 RX ADMIN — Medication 10 MG: at 10:29

## 2020-10-27 RX ADMIN — Medication 3 MG: at 11:15

## 2020-10-27 RX ADMIN — HEPARIN SODIUM 5000 UNITS: 1000 INJECTION, SOLUTION INTRAVENOUS; SUBCUTANEOUS at 10:23

## 2020-10-27 RX ADMIN — ROCURONIUM BROMIDE 30 MG: 50 INJECTION INTRAVENOUS at 08:45

## 2020-10-27 RX ADMIN — MIDAZOLAM HYDROCHLORIDE 1 MG: 2 INJECTION, SOLUTION INTRAMUSCULAR; INTRAVENOUS at 10:14

## 2020-10-27 RX ADMIN — Medication 10 ML: at 22:12

## 2020-10-27 RX ADMIN — ONDANSETRON 4 MG: 2 INJECTION INTRAMUSCULAR; INTRAVENOUS at 11:08

## 2020-10-27 RX ADMIN — FENTANYL CITRATE 50 MCG: 50 INJECTION, SOLUTION INTRAMUSCULAR; INTRAVENOUS at 11:05

## 2020-10-27 RX ADMIN — HEPARIN SODIUM 5000 UNITS: 1000 INJECTION, SOLUTION INTRAVENOUS; SUBCUTANEOUS at 10:01

## 2020-10-27 RX ADMIN — ROCURONIUM BROMIDE 10 MG: 50 INJECTION INTRAVENOUS at 10:15

## 2020-10-27 RX ADMIN — PROPOFOL 200 MG: 10 INJECTION, EMULSION INTRAVENOUS at 08:32

## 2020-10-27 RX ADMIN — SODIUM CHLORIDE, SODIUM LACTATE, POTASSIUM CHLORIDE, AND CALCIUM CHLORIDE: 600; 310; 30; 20 INJECTION, SOLUTION INTRAVENOUS at 08:21

## 2020-10-27 RX ADMIN — ROCURONIUM BROMIDE 10 MG: 50 INJECTION INTRAVENOUS at 09:31

## 2020-10-27 RX ADMIN — OXYCODONE HYDROCHLORIDE AND ACETAMINOPHEN 1 TABLET: 5; 325 TABLET ORAL at 20:07

## 2020-10-27 RX ADMIN — HEPARIN SODIUM 8000 UNITS: 1000 INJECTION, SOLUTION INTRAVENOUS; SUBCUTANEOUS at 10:11

## 2020-10-27 RX ADMIN — FAMOTIDINE 20 MG: 20 TABLET ORAL at 21:40

## 2020-10-27 NOTE — Clinical Note
TRANSFER - IN REPORT:     Verbal report received from: [address] [facility]. Report consisted of patient's Situation, Background, Assessment and   Recommendations(SBAR). Opportunity for questions and clarification was provided. Assessment completed upon patient's arrival to unit and care assumed. Patient transported with a Cardiac Cath Tech / Patient Care Tech.

## 2020-10-27 NOTE — ANESTHESIA PREPROCEDURE EVALUATION
Relevant Problems   No relevant active problems       Anesthetic History   No history of anesthetic complications            Review of Systems / Medical History  Patient summary reviewed and pertinent labs reviewed    Pulmonary        Sleep apnea: CPAP           Neuro/Psych   Within defined limits           Cardiovascular            Dysrhythmias : atrial fibrillation      Exercise tolerance: >4 METS     GI/Hepatic/Renal                Endo/Other      Hypothyroidism: well controlled  Morbid obesity     Other Findings              Physical Exam    Airway  Mallampati: III  TM Distance: 4 - 6 cm  Neck ROM: decreased range of motion   Mouth opening: Normal     Cardiovascular    Rhythm: regular  Rate: normal         Dental  No notable dental hx       Pulmonary  Breath sounds clear to auscultation               Abdominal  GI exam deferred       Other Findings            Anesthetic Plan    ASA: 3  Anesthesia type: general          Induction: Intravenous  Anesthetic plan and risks discussed with: Patient

## 2020-10-27 NOTE — Clinical Note
TRANSFER - OUT REPORT:     [facility]. Report consisted of patient's Situation, Background, Assessment and   Recommendations(SBAR). Opportunity for questions and clarification was provided. Patient transported to: [address]

## 2020-10-27 NOTE — ROUTINE PROCESS
0730 Cath holding summary Patient escorted to cath holding from waiting area ambulatory, alert and oriented x 4, voicing no complaints. Changed into gown and placed on monitor. NPO since MN. Lab results, med rec and H&P reviewed on chart. PIV x 1 inserted without difficulty. 0820 TRANSFER - OUT REPORT: 
 
Verbal report given to Santo Bowman (name) on [facility]  being transferred to EP Lab (unit) for ordered procedure Report consisted of patients Situation, Background, Assessment and  
Recommendations(SBAR). Information from the following report(s) SBAR, Kardex, Intake/Output, MAR, Recent Results and Pre Procedure Checklist was reviewed with the receiving nurse. Lines:  
Peripheral IV 10/27/20 Right Antecubital (Active) Site Assessment Clean, dry, & intact 10/27/20 0810 Phlebitis Assessment 0 10/27/20 0810 Infiltration Assessment 0 10/27/20 0810 Dressing Status Clean, dry, & intact 10/27/20 0810 Dressing Type Transparent 10/27/20 0810 Hub Color/Line Status Pink;Flushed;Patent 10/27/20 0810 Action Taken Blood drawn 10/27/20 0810 Peripheral IV 10/27/20 Left Antecubital (Active) Site Assessment Clean, dry, & intact 10/27/20 0810 Phlebitis Assessment 0 10/27/20 0810 Infiltration Assessment 0 10/27/20 0810 Dressing Status Clean, dry, & intact 10/27/20 0810 Dressing Type Transparent 10/27/20 0810 Hub Color/Line Status Blue;Flushed;Patent 10/27/20 0810 Action Taken Blood drawn 10/27/20 0810 Opportunity for questions and clarification was provided. Patient transported with: 
 Tech 
 
 
1120 TRANSFER - IN REPORT: 
 
Verbal report received from [address] [facility] (name) on [facility]  being received from EP Lab (unit) for routine post - op Report consisted of patients Situation, Background, Assessment and  
Recommendations(SBAR). Information from the following report(s) SBAR, Kardex and Procedure Summary was reviewed with the receiving nurse. Opportunity for questions and clarification was provided. Assessment completed upon patients arrival to unit and care assumed. 1143  at this time. 1234  at this time. 1320 7 Fr and 4 Fr sheath pulled from R groin by Charles Mondragon RN . Pressure held for 20 mins, no bleeding or hematoma. Quik clot and Tegaderm dressing applied. Dressing clean, dry, and intact. 6 Fr and 9 Fr pulled from L groin by Cady White RN. Pressure held for 10 minutes, no bleeding or hematoma. Quick clot and Tegaderm dressing applied. Pt education given on the need to continue lying flat. Will continue to monitor. 1750 TRANSFER - OUT REPORT: 
 
Verbal report given to [address] Kelly RN (name) on Malick Watson  being transferred to CVT SD (unit) for routine progression of care Report consisted of patients Situation, Background, Assessment and  
Recommendations(SBAR). Information from the following report(s) SBAR, Kardex, Procedure Summary, Intake/Output and MAR was reviewed with the receiving nurse. Lines:  
Peripheral IV 10/27/20 Right Antecubital (Active) Site Assessment Clean, dry, & intact 10/27/20 0810 Phlebitis Assessment 0 10/27/20 0810 Infiltration Assessment 0 10/27/20 0810 Dressing Status Clean, dry, & intact 10/27/20 0810 Dressing Type Transparent 10/27/20 0810 Hub Color/Line Status Pink;Flushed;Patent 10/27/20 0810 Action Taken Blood drawn 10/27/20 0810 Peripheral IV 10/27/20 Left Antecubital (Active) Site Assessment Clean, dry, & intact 10/27/20 0810 Phlebitis Assessment 0 10/27/20 0810 Infiltration Assessment 0 10/27/20 0810 Dressing Status Clean, dry, & intact 10/27/20 0810 Dressing Type Transparent 10/27/20 0810 Hub Color/Line Status Blue;Flushed;Patent 10/27/20 0810 Action Taken Blood drawn 10/27/20 0810 Opportunity for questions and clarification was provided. Patient transported with: 
 Registered Nurse 1810 Patient transferred to CVT SD at this time. Sites verified with Giselle Johnson RN. Both groin sites are clean, dry and intact with no s/sx of bleeding, swelling or hematoma.

## 2020-10-27 NOTE — DISCHARGE SUMMARY
I saw, examined, and evaluated the patient. I personally reviewed the patient's labs, tests, vitals, orders, medications, updated history, and other providers assessments. I personally agree with the findings as stated and the plan as documented. Remained SR overnight, no pain. Discharging stable condition. All questions answered. [facility] - Discharge Summary      Discharge Summary     Patient: Rupa Painting MRN: [id number]  SSN: [id number]    YOB: 1960  Age: 56 y.o. Sex: male       Admit Date: 10/27/2020    Discharge Date: 10/28/2020      Admission Diagnoses: A-fib [facility]) [I48.91]  Atrial fibrillation ([facility] [I48.91]    Discharge Diagnoses:   Problem List as of 10/28/2020 Date Reviewed: 10/7/2020          Codes Class Noted - Resolved    Atrial fibrillation ([facility] ICD-10-CM: I48.91  ICD-9-CM: 427.31  10/27/2020 - Present        Severe obesity ([facility] ICD-10-CM: E66.01  ICD-9-CM: 278.01  8/24/2020 - Present        Status post catheter ablation of atrial fibrillation ICD-10-CM: Z98.890  ICD-9-CM: V45.89  7/27/2020 - Present        Hypothyroidism ICD-10-CM: E03.9  ICD-9-CM: 244.9  7/27/2020 - Present        Paroxysmal atrial fibrillation ([facility] ICD-10-CM: I48.0  ICD-9-CM: 427.31  Unknown - Present    Overview Signed 10/22/2012  3:05 PM by Shai Garner MD     s/p afib ablation             Dyslipidemia ICD-10-CM: E78.5  ICD-9-CM: 272.4  Unknown - Present        Sleep apnea ICD-10-CM: G47.30  ICD-9-CM: 780.57  Unknown - Present               Discharge Condition: Stable    Hospital Course: Rupa Painting is a 56 y.o. male who presented to the hospital for MARIAH with EP study and atrial fibrillation ablation due to recurrent symptomatic afib s/p cardioversion 09/2020. He is now s/p successful atrial fibrillation PVI ablation. Course remained stable overnight, discussed starting on PPI therapy x 1 month.     Consults: None    Significant Diagnostic Studies:   MARIAH 10/27/2020:  · LV: Estimated LVEF is 55 - 60%. Visually measured ejection fraction. Normal cavity size and systolic function (ejection fraction normal). Wall motion: normal.  · No left atrial appendage thrombus. · RA: Dilated right atrium. · Saline contrast was given to evaluate for intracardiac shunt. No shunt seen. Disposition: home    Discharge Medications:      My Medications      START taking these medications      Instructions Each Dose to Equal Morning Noon Evening Bedtime   pantoprazole 40 mg tablet  Commonly known as:  PROTONIX  Start taking on:  October 29, 2020    Your last dose was: Your next dose is: Take 1 Tab by mouth Daily (before breakfast). 40 mg                    CONTINUE taking these medications      Instructions Each Dose to Equal Morning Noon Evening Bedtime   fenofibrate nanocrystallized 145 mg tablet  Commonly known as:  TRICOR    Your last dose was: Your next dose is:                          krill oil 500 mg Cap    Your last dose was: Your next dose is: Take  by mouth.                  multivitamin tablet  Commonly known as:  ONE A DAY    Your last dose was: Your next dose is: Take 1 Tab by mouth daily. 1 Tab                 rivaroxaban 20 mg Tab tablet  Commonly known as:  Xarelto    Your last dose was: Your next dose is: Take 1 Tab by mouth daily. 20 mg                 synthroid 50 mcg tablet  Generic drug:  levothyroxine    Your last dose was: Your next dose is:                             ASK your doctor about these medications      Instructions Each Dose to Equal Morning Noon Evening Bedtime   Aleve 220 mg tablet  Generic drug:  naproxen sodium    Your last dose was: Your next dose is: Take 220 mg by mouth as needed.    220 mg                       Where to Get Your Medications      These medications were sent to [address] [facility], [address], [address] [facility] [address] Phone:  [phone]   · pantoprazole 40 mg tablet         Activity: As directed  Diet: Cardiac Diet  Wound Care: As directed    Follow-up Appointments   Procedures    FOLLOW UP VISIT Appointment in: Other (Specify) Follow-up with Dr. Svetlana Flor in 4 weeks. Follow-up with Dr. Svetlana Flor in 4 weeks. Standing Status:   Standing     Number of Occurrences:   1     Order Specific Question:   Appointment in     Answer:    Other (Specify)       Signed By: Shirley Retana PA-C     October 28, 2020

## 2020-10-27 NOTE — Clinical Note
Transseptal Cath Performed check box under hemodynamic and ICE and Fluoro, utilizing a extra sharp needle, Industry 71cm via a guiding sheath. Needle inserted.

## 2020-10-27 NOTE — ANESTHESIA POSTPROCEDURE EVALUATION
Procedure(s):  ABLATION A-FIB  W COMPLETE EP STUDY  Lt Atrial Pace & Record During Ep Study. general    Anesthesia Post Evaluation      Multimodal analgesia: multimodal analgesia used between 6 hours prior to anesthesia start to PACU discharge  Patient location during evaluation: bedside  Patient participation: complete - patient participated  Level of consciousness: awake  Pain management: adequate  Airway patency: patent  Anesthetic complications: no  Cardiovascular status: stable  Respiratory status: acceptable  Hydration status: acceptable  Post anesthesia nausea and vomiting:  controlled      INITIAL Post-op Vital signs:   Vitals Value Taken Time   /83 10/27/2020  1:18 PM   Temp     Pulse 80 10/27/2020  1:20 PM   Resp 18 10/27/2020  1:20 PM   SpO2 99 % 10/27/2020  1:20 PM   Vitals shown include unvalidated device data.

## 2020-10-28 VITALS
TEMPERATURE: 98.2 F | SYSTOLIC BLOOD PRESSURE: 171 MMHG | HEART RATE: 89 BPM | HEIGHT: 75 IN | DIASTOLIC BLOOD PRESSURE: 105 MMHG | OXYGEN SATURATION: 97 % | RESPIRATION RATE: 16 BRPM | BODY MASS INDEX: 34.81 KG/M2 | WEIGHT: 279.98 LBS

## 2020-10-28 PROCEDURE — 99218 HC RM OBSERVATION: CPT

## 2020-10-28 PROCEDURE — 74011250637 HC RX REV CODE- 250/637: Performed by: PHYSICIAN ASSISTANT

## 2020-10-28 PROCEDURE — 99024 POSTOP FOLLOW-UP VISIT: CPT | Performed by: INTERNAL MEDICINE

## 2020-10-28 PROCEDURE — 74011250637 HC RX REV CODE- 250/637: Performed by: INTERNAL MEDICINE

## 2020-10-28 RX ORDER — PANTOPRAZOLE SODIUM 40 MG/1
40 TABLET, DELAYED RELEASE ORAL
Qty: 30 TAB | Refills: 0 | Status: SHIPPED | OUTPATIENT
Start: 2020-10-29 | End: 2020-11-20

## 2020-10-28 RX ADMIN — FENOFIBRATE 145 MG: 145 TABLET ORAL at 09:29

## 2020-10-28 RX ADMIN — PANTOPRAZOLE SODIUM 40 MG: 40 TABLET, DELAYED RELEASE ORAL at 07:34

## 2020-10-28 RX ADMIN — Medication 10 ML: at 09:30

## 2020-10-28 RX ADMIN — THERA TABS 1 TABLET: TAB at 09:29

## 2020-10-28 RX ADMIN — LEVOTHYROXINE SODIUM 50 MCG: 0.05 TABLET ORAL at 09:29

## 2020-10-28 RX ADMIN — RIVAROXABAN 20 MG: 20 TABLET, FILM COATED ORAL at 09:29

## 2020-10-28 NOTE — PROGRESS NOTES
Discharge order noted for today. Orders reviewed. No needs identified at this time.  remains available if needed. Pt to be transported home by his wife. Observation notice provided in writing to patient and/or caregiver as well as verbal explanation of the policy. Patients who are in outpatient status also receive the Observation notice        Reason for Admission:  A-fib ([facility] [I48.91]  Atrial fibrillation ([facility] [I48.91]                 RUR Score:    N/A            Plan for utilizing home health:    no                      Likelihood of Readmission:   LOW                         Transition of Care Plan:              Initial assessment completed with patient. Cognitive status of patient: oriented to time, place, person and situation. Face sheet information confirmed:  yes. The patient designates his wife Nilo to participate in his discharge plan and to receive any needed information. This patient lives in a single family home with his wife. Patient is able to navigate steps as needed. Prior to hospitalization, patient was considered to be independent with ADLs/IADLS : yes . Patient has a current ACP document on file: no  The wife will be available to transport patient home upon discharge. The patient already has a CPAP medical equipment available in the home. Patient is not currently active with home health. Patient has not stayed in a skilled nursing facility or rehab. This patient is on dialysis :no    Currently, the discharge plan is Home. The patient states that he can obtain his medications from the pharmacy, and take his medications as directed. Patient's current insurance is [address] Management Interventions  PCP Verified by CM: Yes  Mode of Transport at Discharge:  Other (see comment)(wife will take him home)  Transition of Care Consult (CM Consult): Discharge Planning  Current Support Network: Lives with Spouse  Discharge Location  Discharge Placement: Home with family assistance        Bong Steinberg  [phone]

## 2020-10-28 NOTE — PROGRESS NOTES
Problem: Falls - Risk of  Goal: *Absence of Falls  Description: Document Malcolm Going Fall Risk and appropriate interventions in the flowsheet.   Outcome: Progressing Towards Goal  Note: Fall Risk Interventions:            Medication Interventions: Bed/chair exit alarm, Patient to call before getting OOB                   Problem: Patient Education: Go to Patient Education Activity  Goal: Patient/Family Education  Outcome: Progressing Towards Goal     Problem: Patient Education: Go to Patient Education Activity  Goal: Patient/Family Education  Outcome: Progressing Towards Goal

## 2020-10-28 NOTE — PROGRESS NOTES
Problem: Falls - Risk of  Goal: *Absence of Falls  Description: Document David Click Fall Risk and appropriate interventions in the flowsheet. 10/28/2020 1130 by Sami Slade  Outcome: Resolved/Met  10/28/2020 1128 by Sami Slade  Outcome: Progressing Towards Goal  Note: Fall Risk Interventions:            Medication Interventions: Bed/chair exit alarm, Patient to call before getting OOB                   Problem: Patient Education: Go to Patient Education Activity  Goal: Patient/Family Education  10/28/2020 1130 by Sami Slade  Outcome: Resolved/Met  10/28/2020 1128 by Aj OMER  Outcome: Progressing Towards Goal     Problem:  Moderate Sedation (Adult)  Goal: *Patent airway  Outcome: Resolved/Met  Goal: *Adequate oxygenation  Outcome: Resolved/Met  Goal: *Absence of aspiration  Outcome: Resolved/Met  Goal: *Hemodynamically stable  Outcome: Resolved/Met  Goal: *Optimal pain control at patient's stated goal  Outcome: Resolved/Met  Goal: *Absence of nausea/vomiting  Outcome: Resolved/Met  Goal: *Anxiety reduced or absent  Outcome: Resolved/Met  Goal: *Absence of injury  Outcome: Resolved/Met  Goal: *Level of consciousness returns to baseline  Outcome: Resolved/Met  Goal: Interventions  Outcome: Resolved/Met     Problem: Patient Education: Go to Patient Education Activity  Goal: Patient/Family Education  10/28/2020 1130 by Sami Slade  Outcome: Resolved/Met  10/28/2020 1128 by Aj OMER  Outcome: Progressing Towards Goal

## 2020-10-28 NOTE — DISCHARGE INSTRUCTIONS
Atrial Fibrillation: Care Instructions  Your Care Instructions     Atrial fibrillation is an irregular and often fast heartbeat. Treating this condition is important for several reasons. It can cause blood clots, which can travel from your heart to your brain and cause a stroke. If you have a fast heartbeat, you may feel lightheaded, dizzy, and weak. An irregular heartbeat can also increase your risk for heart failure. Atrial fibrillation is often the result of another heart condition, such as high blood pressure or coronary artery disease. Making changes to improve your heart condition will help you stay healthy and active. Follow-up care is a key part of your treatment and safety. Be sure to make and go to all appointments, and call your doctor if you are having problems. It's also a good idea to know your test results and keep a list of the medicines you take. How can you care for yourself at home? Medicines    · Take your medicines exactly as prescribed. Call your doctor if you think you are having a problem with your medicine. You will get more details on the specific medicines your doctor prescribes.     · If your doctor has given you a blood thinner to prevent a stroke, be sure you get instructions about how to take your medicine safely. Blood thinners can cause serious bleeding problems.     · Do not take any vitamins, over-the-counter drugs, or herbal products without talking to your doctor first.   Lifestyle changes    · Do not smoke. Smoking can increase your chance of a stroke and heart attack. If you need help quitting, talk to your doctor about stop-smoking programs and medicines. These can increase your chances of quitting for good.     · Eat a heart-healthy diet.     · Stay at a healthy weight. Lose weight if you need to.     · Limit alcohol to 2 drinks a day for men and 1 drink a day for women. Too much alcohol can cause health problems.     · Avoid colds and flu.  Get a pneumococcal vaccine shot. If you have had one before, ask your doctor whether you need another dose. Get a flu shot every year. If you must be around people with colds or flu, wash your hands often. Activity    · If your doctor recommends it, get more exercise. Walking is a good choice. Bit by bit, increase the amount you walk every day. Try for at least 30 minutes on most days of the week. You also may want to swim, bike, or do other activities. Your doctor may suggest that you join a cardiac rehabilitation program so that you can have help increasing your physical activity safely.     · Start light exercise if your doctor says it is okay. Even a small amount will help you get stronger, have more energy, and manage stress. Walking is an easy way to get exercise. Start out by walking a little more than you did in the hospital. Gradually increase the amount you walk.     · When you exercise, watch for signs that your heart is working too hard. You are pushing too hard if you cannot talk while you are exercising. If you become short of breath or dizzy or have chest pain, sit down and rest immediately.     · Check your pulse regularly. Place two fingers on the artery at the palm side of your wrist, in line with your thumb. If your heartbeat seems uneven or fast, talk to your doctor. When should you call for help? Call 911 anytime you think you may need emergency care. For example, call if:    · You have symptoms of a heart attack. These may include:  ? Chest pain or pressure, or a strange feeling in the chest.  ? Sweating. ? Shortness of breath. ? Nausea or vomiting. ? Pain, pressure, or a strange feeling in the back, neck, jaw, or upper belly or in one or both shoulders or arms. ? Lightheadedness or sudden weakness. ? A fast or irregular heartbeat. After you call 911, the  may tell you to chew 1 adult-strength or 2 to 4 low-dose aspirin. Wait for an ambulance.  Do not try to drive yourself.     · You have symptoms of a stroke. These may include:  ? Sudden numbness, tingling, weakness, or loss of movement in your face, arm, or leg, especially on only one side of your body. ? Sudden vision changes. ? Sudden trouble speaking. ? Sudden confusion or trouble understanding simple statements. ? Sudden problems with walking or balance. ? A sudden, severe headache that is different from past headaches.     · You passed out (lost consciousness). Call your doctor now or seek immediate medical care if:    · You have new or increased shortness of breath.     · You feel dizzy or lightheaded, or you feel like you may faint.     · Your heart rate becomes irregular.     · You can feel your heart flutter in your chest or skip heartbeats. Tell your doctor if these symptoms are new or worse. Watch closely for changes in your health, and be sure to contact your doctor if you have any problems. Where can you learn more? Go to http://www.gray.com/  Enter U020 in the search box to learn more about \"Atrial Fibrillation: Care Instructions. \"  Current as of: December 16, 2019               Content Version: 12.6  © 9528-9829 [employer]. Care instructions adapted under license by [employer] (which disclaims liability or warranty for this information). If you have questions about a medical condition or this instruction, always ask your healthcare professional. [place] [address] any warranty or liability for your use of this information. Patient Education        Electrophysiology Study and Catheter Ablation: What to Expect at [address] [place] [address] Drive had an electrophysiology study for a problem with your heartbeat. You may also have had a catheter ablation to try to correct the problem. You may have swelling, bruising, or a small lump around the site where the catheters went into your body. These should go away in 3 to 4 weeks.   Do not exercise hard or lift anything heavy for a week. You may be able to go back to work and to your normal routine in 1 or 2 days. This care sheet gives you a general idea about how long it will take for you to recover. But each person recovers at a different pace. Follow the steps below to get better as quickly as possible. How can you care for yourself at home? Activity    · For 1 week, do not lift anything that would make you strain. This may include heavy grocery bags and milk containers, a heavy briefcase or backpack, cat litter or dog food bags, a vacuum , or a child.     · For 1 week, do not exercise hard or do any activity that could strain your blood vessels or the site where the catheters went into your body.     · Ask your doctor when it is okay to have sex. Diet    · You can eat your normal diet. If your stomach is upset, try bland, low-fat foods like plain rice, broiled chicken, toast, and yogurt.     · Drink plenty of fluids (unless your doctor tells you not to). Medicines    · Your doctor will tell you if and when you can restart your medicines. He or she will also give you instructions about taking any new medicines.     · If you take aspirin or some other blood thinner, ask your doctor if and when to start taking it again. Make sure that you understand exactly what your doctor wants you to do.     · Ask your doctor if you can take acetaminophen (Tylenol) for pain. Do not take aspirin for 3 days, unless your doctor says it is okay.     · Check with your doctor before you take aspirin or anti-inflammatory medicines to reduce pain and swelling. These include ibuprofen (Advil, Motrin) and naproxen (Aleve).   · Make sure you know which heart medicines to continue and which ones to stop. Ask your doctor if you aren't sure. Catheter site care    · You can remove your bandages the day after the procedure.     · You may shower 24 to 48 hours after the procedure, if your doctor okays it.  Pat the incision dry.     · Do not soak the catheter site until it is healed. Don't take a bath for 1 week, or until your doctor tells you it is okay.     · Watch for bleeding from the site. A small amount of blood (up to the size of a quarter) on the bandage can be normal.     · If you are bleeding, lie down and press on the area for 15 minutes to try to make it stop. If the bleeding does not stop, call your doctor or seek immediate medical care. Follow-up care is a key part of your treatment and safety. Be sure to make and go to all appointments, and call your doctor if you are having problems. It's also a good idea to know your test results and keep a list of the medicines you take. When should you call for help? Call  911 anytime you think you may need emergency care. For example, call if:    · You passed out (lost consciousness).     · You have symptoms of a heart attack. These may include:  ? Chest pain or pressure, or a strange feeling in the chest.  ? Sweating. ? Shortness of breath. ? Nausea or vomiting. ? Pain, pressure, or a strange feeling in the back, neck, jaw, or upper belly, or in one or both shoulders or arms. ? Lightheadedness or sudden weakness. ? A fast or irregular heartbeat. After you call 911, the  may tell you to chew 1 adult-strength or 2 to 4 low-dose aspirin. Wait for an ambulance. Do not try to drive yourself.     · You have symptoms of a stroke. These may include:  ? Sudden numbness, tingling, weakness, or loss of movement in your face, arm, or leg, especially on only one side of your body. ? Sudden vision changes. ? Sudden trouble speaking. ? Sudden confusion or trouble understanding simple statements. ? Sudden problems with walking or balance. ? A sudden, severe headache that is different from past headaches.    Call your doctor now or seek immediate medical care if:    · You are bleeding from the area where the catheter was put in your blood vessel.     · You have a fast-growing, painful lump at the catheter site.     · You have signs of infection, such as:  ? Increased pain, swelling, warmth, or redness. ? Red streaks leading from the catheter site. ? Pus draining from the catheter site. ? A fever.     · Your leg, arm, or hand is painful, looks blue, or feels cold, numb, or tingly. Watch closely for any changes in your health, and be sure to contact your doctor if you have any problems. Where can you learn more? Go to http://www.gray.com/  Enter H580 in the search box to learn more about \"Electrophysiology Study and Catheter Ablation: What to Expect at Home. \"  Current as of: December 16, 2019               Content Version: 12.6  © 8969-9592 [employer]. Care instructions adapted under license by [employer] (which disclaims liability or warranty for this information). If you have questions about a medical condition or this instruction, always ask your healthcare professional. [address] any warranty or liability for your use of this information.

## 2020-10-28 NOTE — PROGRESS NOTES
0730- received bedside report from off going nurse Gage. 1145- I have reviewed discharge instructions with the patient. The patient verbalized understanding.

## 2020-10-28 NOTE — PROGRESS NOTES
1940 bedside turnover given to me by KIRILL Lopez. Pt is in bed watching tv on the cardiac telemetry monitor, cvtel 9. He is awake and oriented. Denies pain at incision sites, c/o pain about 4/10 according to him in his head, stated \"I have had a headache since the procedure\". Informed pt I would assess vitals and pain meds avail. 2000 vitals assessed, bp slightly elevated. bp systolic 628R. Pt stated \"I usually have blood pressure around that\". Pt brought his own cpap, biomed at bedside checking it off  Pt inquired about the time he will be discharged, I informed him it is dependent on results of diagnostic tests and cardiology viewing his heart rhythm from over night. 2007  Pt given medication for headache  2045 reassessed for pain, pt has no headache at this time  2130 up to restroom and returned to bed. No signs of distress, no needs at this time. 2215 medications given, lactated ringers infusing, placed on an iv pole to enable pt ability to go to the restroom, he didn't want to notify staff of each time he needed to use the restroom, pt reassured me he is ok to ambulate, after assessing him, he has a steady gait. 2350 vitals reassessed, no other needs at this time, given a small cup of ice water and karen crackers. Still on cardiac telemetry monitor denies pain and denies shortness of breath. Bed is in the lowest position with the wheels locked and call bell within reach. 0020 sleeping in bed on cardiac telemetry monitor, no signs of distress. 0130 sleeping in bed, hrly round no abnormalities noted, no signs of distress bed is in the lowest position with the wheels locked  0230 in bed resting on cardiac monitor, no signs of distress,   0400 vitals pain needs 5 ps assessed, still has lactated ringers infusing  0720 bedside turnover given to KIRILL Segovia. Pt is in bed on the cardiac telemetry monitor, no signs of distress, no abnormalities noted throughout the night.  Bed is in the lowest position with the wheels locked and call bell within reach on the bedside table. SBAR< MAR, ED summary given with a chance to ask questions.

## 2020-11-03 ENCOUNTER — TELEPHONE (OUTPATIENT)
Dept: CARDIOLOGY CLINIC | Age: 60
End: 2020-11-03

## 2020-11-03 DIAGNOSIS — R06.02 SOB (SHORTNESS OF BREATH): Primary | ICD-10-CM

## 2020-11-03 NOTE — TELEPHONE ENCOUNTER
Patient wife calling with concerns , had procedure on 10/27/20 , is now having headaches and dizziness. He is also having diarrhea and leg cramps. The only new medication given at discharge was protonix. Patient does not monitor blood pressure or heart rate at home. Will forward to Dr. Ladoris Lundborg for further instructions.

## 2020-11-04 NOTE — TELEPHONE ENCOUNTER
Patient states it has slightly gotten better but not sure what happened to make him have the diarrhea. He is seeing PCP tomorrow who will take vitals . Patient will call back after that appointment.  He states each day it is getting better

## 2020-11-04 NOTE — TELEPHONE ENCOUNTER
MD Smooth Mccarthy, Zuly Amato, RN    Caller: Unspecified (Yesterday, 10:22 AM)               Usually ablation doesn't cause diarrhea, not sure why that is occurring. Let's check bp and hr to make sure vitals stable. Could be post-anesthesia symptoms but let's get an echo to be sure.    Thx RT at bedside to draw ABG.

## 2020-11-09 DIAGNOSIS — R06.02 SOB (SHORTNESS OF BREATH): ICD-10-CM

## 2020-11-09 NOTE — TELEPHONE ENCOUNTER
Patient called back , still having sob, dizziness,and diarrhea since procedure. He is up 5 times at night using he bathroom. He stopped , milk products , coffee, to see if that helped and nothing has, Imodium helps but as soon as he stops it comes back. A lot of gas is there too.       Will forward this to Dr Bia Watts already orderd

## 2020-11-10 NOTE — TELEPHONE ENCOUNTER
MD Smooth Ayala, Keyona Morrison, RN    Caller: Unspecified (1 week ago, 10:22 AM)               Needs to talk about pmd about stool issues.  Thx

## 2020-11-11 ENCOUNTER — TELEPHONE (OUTPATIENT)
Dept: CARDIOLOGY CLINIC | Age: 60
End: 2020-11-11

## 2020-11-11 NOTE — TELEPHONE ENCOUNTER
Called and spoke with patient to confirm echocardiogram appointment for tomorrow.     Jyoti Jim, RCS, RDCS

## 2020-11-12 LAB
ECHO AO ROOT DIAM: 3.64 CM
ECHO LV INTERNAL DIMENSION DIASTOLIC: 5.37 CM (ref 4.2–5.9)
ECHO LV INTERNAL DIMENSION SYSTOLIC: 3.39 CM
ECHO LV IVSD: 1.31 CM (ref 0.6–1)
ECHO LV MASS 2D: 293 G (ref 88–224)
ECHO LV MASS INDEX 2D: 114.8 G/M2 (ref 49–115)
ECHO LV POSTERIOR WALL DIASTOLIC: 1.29 CM (ref 0.6–1)
ECHO RV TAPSE: 2.32 CM (ref 1.5–2)
ECHO TV REGURGITANT MAX VELOCITY: 229.43 CM/S
ECHO TV REGURGITANT PEAK GRADIENT: 21.06 MMHG

## 2020-11-19 ENCOUNTER — TRANSCRIBE ORDER (OUTPATIENT)
Dept: SCHEDULING | Age: 60
End: 2020-11-19

## 2020-11-19 DIAGNOSIS — M18.10 ARTHRITIS OF CARPOMETACARPAL (CMC) JOINT OF THUMB: Primary | ICD-10-CM

## 2020-11-19 DIAGNOSIS — R22.31 MASS OF FOREARM, RIGHT: ICD-10-CM

## 2020-11-19 DIAGNOSIS — M65.4 DE QUERVAIN'S TENOSYNOVITIS, RIGHT: ICD-10-CM

## 2020-11-20 ENCOUNTER — OFFICE VISIT (OUTPATIENT)
Dept: CARDIOLOGY CLINIC | Age: 60
End: 2020-11-20
Payer: COMMERCIAL

## 2020-11-20 VITALS
SYSTOLIC BLOOD PRESSURE: 138 MMHG | HEIGHT: 76 IN | WEIGHT: 281 LBS | OXYGEN SATURATION: 96 % | BODY MASS INDEX: 34.22 KG/M2 | HEART RATE: 76 BPM | DIASTOLIC BLOOD PRESSURE: 88 MMHG

## 2020-11-20 DIAGNOSIS — E66.9 CLASS 1 OBESITY IN ADULT, UNSPECIFIED BMI, UNSPECIFIED OBESITY TYPE, UNSPECIFIED WHETHER SERIOUS COMORBIDITY PRESENT: ICD-10-CM

## 2020-11-20 DIAGNOSIS — I48.91 ATRIAL FIBRILLATION, UNSPECIFIED TYPE (HCC): Primary | ICD-10-CM

## 2020-11-20 DIAGNOSIS — E78.5 DYSLIPIDEMIA: ICD-10-CM

## 2020-11-20 DIAGNOSIS — G47.33 OBSTRUCTIVE SLEEP APNEA SYNDROME: ICD-10-CM

## 2020-11-20 PROCEDURE — 99215 OFFICE O/P EST HI 40 MIN: CPT | Performed by: INTERNAL MEDICINE

## 2020-11-20 PROCEDURE — 93000 ELECTROCARDIOGRAM COMPLETE: CPT | Performed by: INTERNAL MEDICINE

## 2020-11-20 NOTE — PROGRESS NOTES
HISTORY OF PRESENT ILLNESS  Rodrigo Mcduffie is a 56 y.o. male. Shortness of Breath   Pertinent negatives include no fever, no headaches, no cough, no wheezing, no PND, no orthopnea, no chest pain, no vomiting, no abdominal pain, no rash, no leg swelling and no claudication. Dizziness   Associated symptoms include shortness of breath. Pertinent negatives include no chest pain, no abdominal pain and no headaches. Fatigue   Associated symptoms include shortness of breath. Pertinent negatives include no chest pain, no abdominal pain and no headaches. Hospital Follow Up   Associated symptoms include shortness of breath. Pertinent negatives include no chest pain, no abdominal pain and no headaches. Patient presents for a follow-up office visit. Patient has a past medical history significant for paroxysmal atrial fibrillation, status post atrial fibrillation ablation in 2007. The patient also has a history of obstructive sleep apnea on nightly CPAP, dyslipidemia and hypothyroidism. Patient was found to be back in atrial fibrillation in July 2020 suspected for least 6 to 9 months given his symptom onset. He has been very symptomatic to his atrial fibrillation in the past.  He was started on Xarelto for oral anticoagulation and underwent an elective cardioversion in September 2020, which was initially successful, however he converted back to atrial fibrillation within a few weeks and was feeling again short of breath fatigued and dizzy with decreased activity tolerance. He ultimately underwent a pulmonary vein isolation atrial for ablation procedure with Dr. Kadi Frausto at the end of October 2020. Approximately a week and a half later, he is found to be back in atrial fibrillation during a PCP follow-up visit.   He underwent a follow-up echocardiogram earlier this month in November 2020 which showed preserved LV function, EF 55 to 60%, mild concentric LVH, mild right atrial and right ventricular shunt with normal PA pressures. Over the past 2 weeks, he continues to have excessive fatigue, dyspnea on exertion and decreased activity tolerance. He denies any chest pain or pressure, no leg swelling, orthopnea or PND. Past Medical History:   Diagnosis Date    [facility]     Dyslipidemia     History of cardiovascular stress test 09/2015    Normal stress echocardiogram    History of dizziness     Likely from autonomic insufficiency. Occasional brief loss of vision (10-15 secs).  History of echocardiogram 11/07/2012    EF 55-60%. No WMA. Normal RVSP/PASP.  Paroxysmal atrial fibrillation ([facility] 2007    s/p afib ablation    S/P ablation of atrial fibrillation 2007    Sleep apnea 2005-6    CPAP at night      Current Outpatient Medications   Medication Sig Dispense Refill    rivaroxaban (Xarelto) 20 mg tab tablet Take 1 Tab by mouth daily. 90 Tab 3    synthroid 50 mcg tablet       fenofibrate nanocrystallized (TRICOR) 145 mg tablet       krill oil 500 mg cap Take  by mouth.  multivitamin (ONE A DAY) tablet Take 1 Tab by mouth daily.  naproxen sodium (Aleve) 220 mg tablet Take 220 mg by mouth as needed. Allergies   Allergen Reactions    Cardizem [Diltiazem Hcl] Other (comments)     Headache. Patient states he is not allergic to this medication. Review of Systems   Constitutional: Positive for fatigue. Negative for chills, fever and weight loss. HENT: Negative for nosebleeds. Eyes: Negative for blurred vision and double vision. Respiratory: Positive for shortness of breath. Negative for cough and wheezing. Cardiovascular: Negative for chest pain, palpitations, orthopnea, claudication, leg swelling and PND. Gastrointestinal: Negative for abdominal pain, heartburn, nausea and vomiting. Genitourinary: Negative for dysuria and hematuria. Musculoskeletal: Negative for falls and myalgias. Skin: Negative for rash. Neurological: Positive for dizziness. Negative for focal weakness and headaches. Endo/Heme/Allergies: Does not bruise/bleed easily. Psychiatric/Behavioral: Negative for substance abuse. Visit Vitals  /88 (BP 1 Location: Left arm, BP Patient Position: Sitting)   Pulse 76   Ht 6' 4\" (1.93 m)   Wt 127.5 kg (281 lb)   SpO2 96%   BMI 34.20 kg/m²      Physical Exam   Constitutional: He is oriented to person, place, and time. He appears well-developed and well-nourished. HENT:   Head: Normocephalic and atraumatic. Eyes: Conjunctivae are normal.   Neck: Neck supple. No JVD present. Carotid bruit is not present. Cardiovascular: Normal rate, S1 normal, S2 normal and normal pulses. An irregularly irregular rhythm present. Exam reveals distant heart sounds. Exam reveals no gallop and no S3. No murmur heard. Pulmonary/Chest: Breath sounds normal. He has no wheezes. He has no rales. Abdominal: Soft. Bowel sounds are normal. There is no abdominal tenderness. Musculoskeletal:         General: No tenderness, deformity or edema. Neurological: He is alert and oriented to person, place, and time. Skin: Skin is warm and dry. Psychiatric: He has a normal mood and affect. His behavior is normal. Thought content normal.     EKG: Atrial fibrillation, normal axis, poor R wave progression, no ST-T wave changes concerning for ischemia. Compared to the previous EKG, atrial fibrillation has replaced sinus rhythm. ASSESSMENT and PLAN    Paroxysmal atrial fibrillation. Status post initial atrial fibrillation ablation 2007. Patient was back in atrial fibrillation again in July 2020 suspected for at least six or more months. He poorly tolerates his arrhythmia due to fatigue and decreased activity tolerance. Attempted cardioversion in September 2020 maintain sinus rhythm for less than 2 weeks. He then underwent a repeat ablation at the end of October 2020 with a pulmonary vein isolation procedure using a cryocatheter.   Patient was back in atrial fibrillation 10 days later with similar symptoms. He does not require rate controlling agents so does likely have a component of underlying conduction disease. I have recommended admission with Tikosyn loading to see if we can maintain sinus rhythm. This will be scheduled at the beginning of next month. I did advise the patient he may ultimately require a pacemaker if he develops significant bradycardia with antiarrhythmic therapy. Dyslipidemia. Patient primarily has elevated triglycerides and he has been managed with fenofibrate and krill oil by his PCP. Hypothyroidism. Patient is been on a stable dose of Synthroid for many years now. Patient scheduled to have repeat blood work next week. Obstructive sleep apnea. On nightly CPAP. Obesity. Patient reports that his weight has been fairly stable over the past 6 months. I have recommended trying to lose at least 20 to 30 pounds of weight with lifestyle modification. Total visit time was 40 minutes of which greater than 50% was face-to-face counseling. Follow-up to be scheduled following his hospitalization.

## 2020-11-20 NOTE — PROGRESS NOTES
Tristan Alexander presents today for   Chief Complaint   Patient presents with   [facility] Follow Up     for AFib       Tristan Alexander preferred language for health care discussion is english/other. Is someone accompanying this pt? no    Is the patient using any DME equipment during [address]? no    Depression Screening:  3 most recent PHQ Screens 11/20/2020   Little interest or pleasure in doing things Not at all   Feeling down, depressed, irritable, or hopeless Not at all   Total Score PHQ 2 0       Learning Assessment:  Learning Assessment 11/20/2020   PRIMARY LEARNER Patient   PRIMARY LANGUAGE ENGLISH   LEARNER PREFERENCE PRIMARY DEMONSTRATION   ANSWERED BY patient   RELATIONSHIP SELF       Abuse Screening:  Abuse Screening Questionnaire 11/20/2020   Do you ever feel afraid of your partner? N   Are you in a relationship with someone who physically or mentally threatens you? N   Is it safe for you to go home? Y       Fall Risk  Fall Risk Assessment, last 12 mths 11/20/2020   Able to walk? Yes   Fall in past 12 months? -       Pt currently taking Anticoagulant therapy? Xarelto 20 mg once a day    Coordination of Care:  1. Have you been to the ER, urgent care clinic since your last visit? Hospitalized since your last visit? yes    2. Have you seen or consulted any other health care providers outside of the [address] [place] [address] since your last visit? Include any pap smears or colon screening.  no

## 2020-11-25 ENCOUNTER — TELEPHONE (OUTPATIENT)
Dept: CARDIOLOGY CLINIC | Age: 60
End: 2020-11-25

## 2020-11-30 ENCOUNTER — DOCUMENTATION ONLY (OUTPATIENT)
Dept: CARDIOLOGY CLINIC | Age: 60
End: 2020-11-30

## 2020-11-30 NOTE — PROGRESS NOTES
[facility] For Admission For Tikosyn Loading On 12/2/20    Auth.  # [id number]

## 2020-12-02 ENCOUNTER — HOSPITAL ENCOUNTER (OUTPATIENT)
Age: 60
Setting detail: OBSERVATION
Discharge: HOME | End: 2020-12-04
Attending: INTERNAL MEDICINE | Admitting: INTERNAL MEDICINE
Payer: COMMERCIAL

## 2020-12-02 DIAGNOSIS — I48.0 PAROXYSMAL ATRIAL FIBRILLATION (HCC): ICD-10-CM

## 2020-12-02 LAB
ANION GAP SERPL CALC-SCNC: 3 MMOL/L (ref 3–18)
ATRIAL RATE: 202 BPM
BUN SERPL-MCNC: 31 MG/DL (ref 7–18)
BUN/CREAT SERPL: 25 (ref 12–20)
CALCIUM SERPL-MCNC: 9.5 MG/DL (ref 8.5–10.1)
CALCULATED R AXIS, ECG10: 26 DEGREES
CALCULATED T AXIS, ECG11: 62 DEGREES
CHLORIDE SERPL-SCNC: 109 MMOL/L (ref 100–111)
CO2 SERPL-SCNC: 30 MMOL/L (ref 21–32)
CREAT SERPL-MCNC: 1.22 MG/DL (ref 0.6–1.3)
DIAGNOSIS, 93000: NORMAL
GLUCOSE SERPL-MCNC: 95 MG/DL (ref 74–99)
MAGNESIUM SERPL-MCNC: 2 MG/DL (ref 1.6–2.6)
POTASSIUM SERPL-SCNC: 4.4 MMOL/L (ref 3.5–5.5)
Q-T INTERVAL, ECG07: 404 MS
QRS DURATION, ECG06: 94 MS
QTC CALCULATION (BEZET), ECG08: 393 MS
SODIUM SERPL-SCNC: 142 MMOL/L (ref 136–145)
TSH SERPL DL<=0.05 MIU/L-ACNC: 1.17 UIU/ML (ref 0.36–3.74)
VENTRICULAR RATE, ECG03: 57 BPM

## 2020-12-02 PROCEDURE — 74011250637 HC RX REV CODE- 250/637: Performed by: INTERNAL MEDICINE

## 2020-12-02 PROCEDURE — 80048 BASIC METABOLIC PNL TOTAL CA: CPT

## 2020-12-02 PROCEDURE — 83735 ASSAY OF MAGNESIUM: CPT

## 2020-12-02 PROCEDURE — 65660000000 HC RM CCU STEPDOWN

## 2020-12-02 PROCEDURE — 99223 1ST HOSP IP/OBS HIGH 75: CPT | Performed by: INTERNAL MEDICINE

## 2020-12-02 PROCEDURE — 2709999900 HC NON-CHARGEABLE SUPPLY

## 2020-12-02 PROCEDURE — 99218 HC RM OBSERVATION: CPT

## 2020-12-02 PROCEDURE — 93005 ELECTROCARDIOGRAM TRACING: CPT

## 2020-12-02 PROCEDURE — 84443 ASSAY THYROID STIM HORMONE: CPT

## 2020-12-02 PROCEDURE — 36415 COLL VENOUS BLD VENIPUNCTURE: CPT

## 2020-12-02 RX ORDER — LEVOTHYROXINE SODIUM 25 UG/1
50 TABLET ORAL
Status: DISCONTINUED | OUTPATIENT
Start: 2020-12-03 | End: 2020-12-04 | Stop reason: HOSPADM

## 2020-12-02 RX ORDER — FENOFIBRATE 145 MG/1
145 TABLET, COATED ORAL DAILY
Status: DISCONTINUED | OUTPATIENT
Start: 2020-12-02 | End: 2020-12-04 | Stop reason: HOSPADM

## 2020-12-02 RX ORDER — DOFETILIDE 0.25 MG/1
500 CAPSULE ORAL EVERY 12 HOURS
Status: DISCONTINUED | OUTPATIENT
Start: 2020-12-02 | End: 2020-12-04 | Stop reason: HOSPADM

## 2020-12-02 RX ORDER — SODIUM CHLORIDE 0.9 % (FLUSH) 0.9 %
5-40 SYRINGE (ML) INJECTION AS NEEDED
Status: DISCONTINUED | OUTPATIENT
Start: 2020-12-02 | End: 2020-12-04 | Stop reason: HOSPADM

## 2020-12-02 RX ORDER — SODIUM CHLORIDE 0.9 % (FLUSH) 0.9 %
5-40 SYRINGE (ML) INJECTION EVERY 8 HOURS
Status: DISCONTINUED | OUTPATIENT
Start: 2020-12-02 | End: 2020-12-04 | Stop reason: HOSPADM

## 2020-12-02 RX ADMIN — DOFETILIDE 500 MCG: 0.25 CAPSULE ORAL at 14:14

## 2020-12-02 RX ADMIN — Medication 10 ML: at 14:00

## 2020-12-02 NOTE — PROGRESS NOTES
Reason for Admission:  Paroxysmal atrial fibrillation ([facility] [I48.0]                 RUR Score:    6%            Plan for utilizing home health:    Not at this time. Likelihood of Readmission:   LOW                         Transition of Care Plan:              Initial assessment completed with patient. Cognitive status of patient: oriented to time, place, person and situation. Face sheet information confirmed:  yes. The patient designates his wife Bee Ledbetter and son Shayla Faye to participate in his discharge plan and to receive any needed information. This patient lives in a single family home with his wife. Patient is able to navigate steps as needed. Prior to hospitalization, patient was considered to be independent with ADLs/IADLS : yes . Patient has a current ACP document on file: no  The wife will be available to transport patient home upon discharge. The patient already has CPAP medical equipment available in the home. Patient is not currently active with home health. Patient has not stayed in a skilled nursing facility or rehab. This patient is on dialysis :no    Currently, the discharge plan is Home. The patient states that he can obtain his medications from the pharmacy, and take his medications as directed. Patient's current insurance is [address] Management Interventions  PCP Verified by CM: Yes  Mode of Transport at Discharge:  Other (see comment)(wife will take home)  Transition of Care Consult (CM Consult): Discharge Planning  Physical Therapy Consult: No  Occupational Therapy Consult: No  Current Support Network: Lives with Spouse  Confirm Follow Up Transport: Family  Discharge Location  Discharge Placement: Home with family assistance        Angeles Brewster RN BSN  Care Manager  [phone]

## 2020-12-02 NOTE — H&P
HISTORY OF PRESENT ILLNESS  Lambert Akhtar is a 56 y.o. male.     Shortness of Breath   Pertinent negatives include no fever, no headaches, no cough, no wheezing, no PND, no orthopnea, no chest pain, no vomiting, no abdominal pain, no rash, no leg swelling and no claudication. Dizziness   Associated symptoms include shortness of breath. Pertinent negatives include no chest pain, no abdominal pain and no headaches. Fatigue   Associated symptoms include shortness of breath. Pertinent negatives include no chest pain, no abdominal pain and no headaches.         Patient has a past medical history significant for paroxysmal atrial fibrillation, status post atrial fibrillation ablation in 2007. The patient also has a history of obstructive sleep apnea on nightly CPAP, dyslipidemia and hypothyroidism.     Patient was found to be back in atrial fibrillation in July 2020 suspected for least 6 to 9 months given his symptom onset. He has been very symptomatic to his atrial fibrillation in the past.  He was started on Xarelto for oral anticoagulation and underwent an elective cardioversion in September 2020, which was initially successful, however he converted back to atrial fibrillation within a few weeks and was feeling again short of breath fatigued and dizzy with decreased activity tolerance. He ultimately underwent a pulmonary vein isolation atrial for ablation procedure with Dr. Thom Perez at the end of October 2020. Approximately a week and a half later, he is found to be back in atrial fibrillation during a PCP follow-up visit.   He underwent a follow-up echocardiogram earlier this month in November 2020 which showed preserved LV function, EF 55 to 60%, mild concentric LVH, mild right atrial and right ventricular shunt with normal PA pressures.     Patient presents for scheduled admission to the hospital to initiate antiarrhythmic therapy with dofetilide.          Past Medical History:   Diagnosis Date    Crohn disease ([facility]      Dyslipidemia      History of cardiovascular stress test 09/2015     Normal stress echocardiogram    History of dizziness       Likely from autonomic insufficiency. Occasional brief loss of vision (10-15 secs).  History of echocardiogram 11/07/2012     EF 55-60%. No WMA. Normal RVSP/PASP.  Paroxysmal atrial fibrillation ([facility] 2007     s/p afib ablation    S/P ablation of atrial fibrillation 2007    Sleep apnea 2005-6     CPAP at night             Current Outpatient Medications   Medication Sig Dispense Refill    rivaroxaban (Xarelto) 20 mg tab tablet Take 1 Tab by mouth daily. 90 Tab 3    synthroid 50 mcg tablet          fenofibrate nanocrystallized (TRICOR) 145 mg tablet          krill oil 500 mg cap Take  by mouth.        multivitamin (ONE A DAY) tablet Take 1 Tab by mouth daily.        naproxen sodium (Aleve) 220 mg tablet Take 220 mg by mouth as needed.                 Allergies   Allergen Reactions    Cardizem [Diltiazem Hcl] Other (comments)       Headache.     Patient states he is not allergic to this medication.             Review of Systems   Constitutional: Positive for fatigue. Negative for chills, fever and weight loss. HENT: Negative for nosebleeds. Eyes: Negative for blurred vision and double vision. Respiratory: Positive for shortness of breath. Negative for cough and wheezing. Cardiovascular: Negative for chest pain, palpitations, orthopnea, claudication, leg swelling and PND. Gastrointestinal: Negative for abdominal pain, heartburn, nausea and vomiting. Genitourinary: Negative for dysuria and hematuria. Musculoskeletal: Negative for falls and myalgias. Skin: Negative for rash. Neurological: Positive for dizziness. Negative for focal weakness and headaches. Endo/Heme/Allergies: Does not bruise/bleed easily.    Psychiatric/Behavioral: Negative for substance abuse.      Visit Vitals  /88 (BP 1 Location: Left arm, BP Patient Position: Sitting)   Pulse 76   Ht 6' 4\" (1.93 m)   Wt 127.5 kg (281 lb)   SpO2 96%   BMI 34.20 kg/m²      Physical Exam   Constitutional: He is oriented to person, place, and time. He appears well-developed and well-nourished. HENT:   Head: Normocephalic and atraumatic. Eyes: Conjunctivae are normal.   Neck: Neck supple. No JVD present. Carotid bruit is not present. Cardiovascular: Normal rate, S1 normal, S2 normal and normal pulses. An irregularly irregular rhythm present. Exam reveals distant heart sounds. Exam reveals no gallop and no S3. No murmur heard. Pulmonary/Chest: Breath sounds normal. He has no wheezes. He has no rales. Abdominal: Soft. Bowel sounds are normal. There is no abdominal tenderness. Musculoskeletal:         General: No tenderness, deformity or edema. Neurological: He is alert and oriented to person, place, and time. Skin: Skin is warm and dry. Psychiatric: He has a normal mood and affect. His behavior is normal. Thought content normal.      ASSESSMENT and PLAN     Paroxysmal atrial fibrillation. Status post initial atrial fibrillation ablation 2007. Patient was back in atrial fibrillation again in July 2020 suspected for at least six or more months. He poorly tolerates his arrhythmia due to fatigue and decreased activity tolerance. Attempted cardioversion in September 2020 maintain sinus rhythm for less than 2 weeks. He then underwent a repeat ablation at the end of October 2020 with a pulmonary vein isolation procedure using a cryocatheter. Patient was back in atrial fibrillation 10 days later with similar symptoms. He does not require rate controlling agents so does likely have a component of underlying conduction disease. I have recommended admission with Tikosyn loading to see if we can maintain sinus rhythm. I did advise the patient he may ultimately require a pacemaker if he develops significant bradycardia with antiarrhythmic therapy.   A baseline EKG will be performed this morning prior to initiation of his 1st Tikosyn dosage. He will then require twice daily EKGs following his Tikosyn dosage to monitor his QTc interval.  He will need telemetry monitoring for a minimum of 48 to possibly 72 hours. Will check baseline electrolytes this am,     Dyslipidemia. Patient primarily has elevated triglycerides and he has been managed with fenofibrate and krill oil by his PCP.     Hypothyroidism. Patient is been on a stable dose of Synthroid for many years now. A repeat thyroid panel to be drawn today.      Obstructive sleep apnea. On nightly CPAP.     Obesity. Patient reports that his weight has been fairly stable over the past 6 months. I have recommended trying to lose at least 20 to 30 pounds of weight with lifestyle modification.

## 2020-12-02 NOTE — PROGRESS NOTES
Patient resting in bed. A&Ox4. Up ad joshua. Cardiac diet. Bed locked in lowest position. Call bell within reach.      Problem: Pain  Goal: *Control of Pain  Outcome: Progressing Towards Goal     Problem: Patient Education: Go to Patient Education Activity  Goal: Patient/Family Education  Outcome: Progressing Towards Goal

## 2020-12-02 NOTE — ACP (ADVANCE CARE PLANNING)
Advance Care Planning     General Advance Care Planning (ACP) Conversation      Date of Conversation: 11/30/2020  Conducted with: Patient with Decision Making Capacity    Healthcare Decision Maker:     Click here to complete [place] including selection of the Healthcare Decision Maker Relationship (ie \"Primary\")  Today we documented Decision Maker(s). The patient will provide ACP documents. Pt believes he completed one before. Primary decision maker Elliot Antis- wife [phone]  Secondary decision maker Dean Fang- son [phone]    Content/Action Overview:    Has ACP document(s) NOT on file - requested patient to provide      [address]  [phone]

## 2020-12-03 LAB
ATRIAL RATE: 202 BPM
ATRIAL RATE: 78 BPM
ATRIAL RATE: 97 BPM
CALCULATED P AXIS, ECG09: 38 DEGREES
CALCULATED R AXIS, ECG10: 23 DEGREES
CALCULATED R AXIS, ECG10: 25 DEGREES
CALCULATED R AXIS, ECG10: 63 DEGREES
CALCULATED T AXIS, ECG11: 49 DEGREES
CALCULATED T AXIS, ECG11: 58 DEGREES
CALCULATED T AXIS, ECG11: 88 DEGREES
DIAGNOSIS, 93000: NORMAL
P-R INTERVAL, ECG05: 236 MS
Q-T INTERVAL, ECG07: 426 MS
Q-T INTERVAL, ECG07: 442 MS
Q-T INTERVAL, ECG07: 476 MS
QRS DURATION, ECG06: 94 MS
QRS DURATION, ECG06: 94 MS
QRS DURATION, ECG06: 98 MS
QTC CALCULATION (BEZET), ECG08: 430 MS
QTC CALCULATION (BEZET), ECG08: 446 MS
QTC CALCULATION (BEZET), ECG08: 485 MS
VENTRICULAR RATE, ECG03: 53 BPM
VENTRICULAR RATE, ECG03: 57 BPM
VENTRICULAR RATE, ECG03: 78 BPM

## 2020-12-03 PROCEDURE — 93005 ELECTROCARDIOGRAM TRACING: CPT

## 2020-12-03 PROCEDURE — 99218 HC RM OBSERVATION: CPT

## 2020-12-03 PROCEDURE — 99233 SBSQ HOSP IP/OBS HIGH 50: CPT | Performed by: INTERNAL MEDICINE

## 2020-12-03 PROCEDURE — 74011250637 HC RX REV CODE- 250/637: Performed by: INTERNAL MEDICINE

## 2020-12-03 PROCEDURE — 65660000000 HC RM CCU STEPDOWN

## 2020-12-03 RX ORDER — ACETAMINOPHEN 500 MG
1000 TABLET ORAL
Status: DISCONTINUED | OUTPATIENT
Start: 2020-12-03 | End: 2020-12-04 | Stop reason: HOSPADM

## 2020-12-03 RX ADMIN — LEVOTHYROXINE SODIUM 50 MCG: 0.03 TABLET ORAL at 06:13

## 2020-12-03 RX ADMIN — Medication 10 ML: at 15:33

## 2020-12-03 RX ADMIN — Medication 10 ML: at 06:15

## 2020-12-03 RX ADMIN — Medication 10 ML: at 23:52

## 2020-12-03 RX ADMIN — DOFETILIDE 500 MCG: 0.25 CAPSULE ORAL at 12:15

## 2020-12-03 RX ADMIN — DOFETILIDE 500 MCG: 0.25 CAPSULE ORAL at 23:51

## 2020-12-03 RX ADMIN — Medication 10 ML: at 01:59

## 2020-12-03 RX ADMIN — RIVAROXABAN 20 MG: 20 TABLET, FILM COATED ORAL at 11:00

## 2020-12-03 RX ADMIN — FENOFIBRATE 145 MG: 145 TABLET ORAL at 11:00

## 2020-12-03 RX ADMIN — DOFETILIDE 500 MCG: 0.25 CAPSULE ORAL at 01:57

## 2020-12-03 RX ADMIN — ACETAMINOPHEN 1000 MG: 500 TABLET ORAL at 17:25

## 2020-12-03 NOTE — ROUTINE PROCESS
Bedside and Verbal shift change report given to Graciela Badillo RN (oncoming nurse) by Regulo Morrison RN (offgoing nurse). Report included the following information SBAR, Kardex, Intake/Output, MAR and Recent Results.

## 2020-12-03 NOTE — PROGRESS NOTES
[facility] - Progress Note  Admit Date: 12/2/2020    Assessment:     Hospital Problems  Date Reviewed: 11/20/2020          Codes Class Noted POA    Paroxysmal atrial fibrillation ([facility] ICD-10-CM: I48.0  ICD-9-CM: 427.31  Unknown Unknown    Overview Signed 10/22/2012  3:05 PM by Traci Zamudio MD     s/p afib ablation                   Paroxysmal atrial fibrillation.  Status post initial atrial fibrillation ablation 2007.  Patient was back in atrial fibrillation again in July 2020 suspected for at least six or more months. [facility] poorly tolerates his arrhythmia due to fatigue and decreased activity tolerance.  Attempted cardioversion in September 2020 maintain sinus rhythm for less than 2 weeks. [facility] then underwent a repeat ablation at the end of October 2020 with a pulmonary vein isolation procedure using a cryocatheter.  Patient was back in atrial fibrillation 10 days later with similar symptoms.  He does not require rate controlling agents so does likely have a component of underlying conduction disease.  I have recommended admission with Tikosyn loading to see if we can maintain sinus rhythm.   I did advise the patient he may ultimately require a pacemaker if he develops significant bradycardia with antiarrhythmic therapy. A baseline EKG will be performed this morning prior to initiation of his 1st Tikosyn dosage.   He will then require twice daily EKGs following his Tikosyn dosage to monitor his QTc interval.  He will need telemetry monitoring for a minimum of 48 to possibly 72 hours.      Dyslipidemia.  Patient primarily has elevated triglycerides and he has been managed with fenofibrate and krill oil by his PCP.     Hypothyroidism.  Patient is been on a stable dose of Synthroid for many years now.  A repeat TSH 1.17 this admission.     Obstructive sleep apnea.  On nightly CPAP.     Obesity.  Patient reports that his weight has been fairly stable over the past 6 months.  I have recommended trying to lose at least 20 to 30 pounds of weight with lifestyle modification.       Plan:     Independently seen and evaluated. Agree with below. Patient is tolerating the Tikosyn load without difficulty. He had multiple questions about the dosing, physiology as well as was going on with her sister and her atrial fibrillation. All questions were answered. Over 25 minutes spent answering questions. His heart rate is stable at this time. Would continue to watch. Do not suspect that he will need a permanent pacemaker insertion at this time. Hopefully can be discharged home tomorrow. Appears to be tolerating Tikosyn loading with QTc 446. Patient appears to have converted to sinus this Am. No significant bradycardia thus far. Continued on Xarelto. Will plan to continue telemetry monitoring until tomorrow. Subjective:     Currently sinus with PACs, PAfib/flutter noted overnight. No significant bradycardia.     Objective:      Patient Vitals for the past 8 hrs:   Temp Pulse Resp BP SpO2   12/03/20 0345 98.2 °F (36.8 °C) 65 17 130/78 97 %         Patient Vitals for the past 96 hrs:   Weight   12/02/20 1133 280 lb 8 oz (127.2 kg)                    Intake/Output Summary (Last 24 hours) at 12/3/2020 1040  Last data filed at 12/3/2020 0345  Gross per 24 hour   Intake 460 ml   Output    Net 460 ml       Physical Exam:  General:  alert, cooperative, no distress, appears stated age  Neck:  no JVD  Lungs:  clear to auscultation bilaterally  Heart:  regular rate and rhythm  Abdomen:  abdomen is soft without significant tenderness, masses, organomegaly or guarding  Extremities:  extremities normal, atraumatic, no cyanosis or edema    Data Review:     Labs: Results:       Chemistry Recent Labs     12/02/20  1004   GLU 95      K 4.4      CO2 30   BUN 31*   CREA 1.22   CA 9.5   MG 2.0   AGAP 3   BUCR 25*      CBC w/Diff No results for input(s): WBC, RBC, HGB, HCT, PLT, GRANS, LYMPH, EOS, HGBEXT, HCTEXT, PLTEXT in the last 72 hours. Cardiac Enzymes No results found for: CPK, CK, CKMMB, CKMB, RCK3, CKMBT, CKNDX, CKND1, BABAR, TROPT, TROIQ, AVTAR, TROPT, TNIPOC, BNP, BNPP   Coagulation No results for input(s): PTP, INR, APTT, INREXT in the last 72 hours. Lipid Panel No results found for: CHOL, CHOLPOCT, CHOLX, CHLST, CHOLV, 293732, HDL, HDLP, LDL, LDLC, DLDLP, 523374, VLDLC, VLDL, TGLX, TRIGL, TRIGP, TGLPOCT, CHHD, CHHDX   BNP No results found for: BNP, BNPP, XBNPT   Liver Enzymes No results for input(s): TP, ALB, TBIL, AP in the last 72 hours.     No lab exists for component: SGOT, GPT, DBIL   Digoxin    Thyroid Studies Lab Results   Component Value Date/Time    TSH 1.17 12/02/2020 10:04 AM          Signed By: CRISTINA Medrano     December 3, 2020

## 2020-12-03 NOTE — PROGRESS NOTES
Problem: Pain  Goal: *Control of Pain  Outcome: Progressing Towards Goal     Problem: Patient Education: Go to Patient Education Activity  Goal: Patient/Family Education  Outcome: Progressing Towards Goal     Problem: Falls - Risk of  Goal: *Absence of Falls  Description: Document Selvin Contreras Fall Risk and appropriate interventions in the flowsheet.   Outcome: Progressing Towards Goal  Note: Fall Risk Interventions:            Medication Interventions: Teach patient to arise slowly, Evaluate medications/consider consulting pharmacy                   Problem: Patient Education: Go to Patient Education Activity  Goal: Patient/Family Education  Outcome: Progressing Towards Goal     Problem: Arrhythmia Pathway (Adult)  Goal: *Absence of arrhythmia  Outcome: Progressing Towards Goal     Problem: Patient Education: Go to Patient Education Activity  Goal: Patient/Family Education  Outcome: Progressing Towards Goal

## 2020-12-03 NOTE — PROGRESS NOTES
Bedside shift change report given to Jose Manuel Keith RN (oncoming nurse) by Danielle Mathews RN (offgoing nurse). Report included the following information SBAR, Kardex, Procedure Summary, Intake/Output, MAR and Recent Results.

## 2020-12-03 NOTE — PROGRESS NOTES
Pt c/o headache \"kicking my butt\". Says he takes 3 extra strength tylenol at home when he gets a headache. Will give 1000 mg now for HA and he says OK.  Will see if this helps, if not will call MD

## 2020-12-04 VITALS
DIASTOLIC BLOOD PRESSURE: 88 MMHG | HEART RATE: 81 BPM | WEIGHT: 280.5 LBS | TEMPERATURE: 99.4 F | BODY MASS INDEX: 34.14 KG/M2 | RESPIRATION RATE: 19 BRPM | SYSTOLIC BLOOD PRESSURE: 147 MMHG | OXYGEN SATURATION: 95 %

## 2020-12-04 LAB
ANION GAP SERPL CALC-SCNC: 6 MMOL/L (ref 3–18)
ATRIAL RATE: 77 BPM
BUN SERPL-MCNC: 24 MG/DL (ref 7–18)
BUN/CREAT SERPL: 20 (ref 12–20)
CALCIUM SERPL-MCNC: 9.2 MG/DL (ref 8.5–10.1)
CALCULATED P AXIS, ECG09: 32 DEGREES
CALCULATED R AXIS, ECG10: 37 DEGREES
CALCULATED T AXIS, ECG11: 46 DEGREES
CHLORIDE SERPL-SCNC: 106 MMOL/L (ref 100–111)
CO2 SERPL-SCNC: 29 MMOL/L (ref 21–32)
CREAT SERPL-MCNC: 1.21 MG/DL (ref 0.6–1.3)
DIAGNOSIS, 93000: NORMAL
GLUCOSE SERPL-MCNC: 95 MG/DL (ref 74–99)
MAGNESIUM SERPL-MCNC: 2.2 MG/DL (ref 1.6–2.6)
P-R INTERVAL, ECG05: 260 MS
POTASSIUM SERPL-SCNC: 4 MMOL/L (ref 3.5–5.5)
Q-T INTERVAL, ECG07: 328 MS
QRS DURATION, ECG06: 96 MS
QTC CALCULATION (BEZET), ECG08: 371 MS
SODIUM SERPL-SCNC: 141 MMOL/L (ref 136–145)
VENTRICULAR RATE, ECG03: 77 BPM

## 2020-12-04 PROCEDURE — 80048 BASIC METABOLIC PNL TOTAL CA: CPT

## 2020-12-04 PROCEDURE — 93005 ELECTROCARDIOGRAM TRACING: CPT

## 2020-12-04 PROCEDURE — 99218 HC RM OBSERVATION: CPT

## 2020-12-04 PROCEDURE — 83735 ASSAY OF MAGNESIUM: CPT

## 2020-12-04 PROCEDURE — 99238 HOSP IP/OBS DSCHRG MGMT 30/<: CPT | Performed by: INTERNAL MEDICINE

## 2020-12-04 PROCEDURE — 36415 COLL VENOUS BLD VENIPUNCTURE: CPT

## 2020-12-04 PROCEDURE — 74011250637 HC RX REV CODE- 250/637: Performed by: INTERNAL MEDICINE

## 2020-12-04 RX ORDER — DOFETILIDE 0.5 MG/1
500 CAPSULE ORAL EVERY 12 HOURS
Qty: 28 CAP | Refills: 0 | Status: SHIPPED | OUTPATIENT
Start: 2020-12-04 | End: 2020-12-04 | Stop reason: SDUPTHER

## 2020-12-04 RX ORDER — DOFETILIDE 0.5 MG/1
500 CAPSULE ORAL 2 TIMES DAILY
Qty: 60 CAP | Refills: 6 | Status: SHIPPED | OUTPATIENT
Start: 2020-12-04 | End: 2020-12-04 | Stop reason: SDUPTHER

## 2020-12-04 RX ORDER — DOFETILIDE 0.5 MG/1
500 CAPSULE ORAL 2 TIMES DAILY
Qty: 180 CAP | Refills: 3 | Status: SHIPPED | OUTPATIENT
Start: 2020-12-04 | End: 2020-12-17 | Stop reason: SDUPTHER

## 2020-12-04 RX ADMIN — FENOFIBRATE 145 MG: 145 TABLET ORAL at 09:02

## 2020-12-04 RX ADMIN — LEVOTHYROXINE SODIUM 50 MCG: 0.03 TABLET ORAL at 06:04

## 2020-12-04 RX ADMIN — Medication 10 ML: at 06:05

## 2020-12-04 RX ADMIN — DOFETILIDE 500 MCG: 0.25 CAPSULE ORAL at 12:00

## 2020-12-04 RX ADMIN — RIVAROXABAN 20 MG: 20 TABLET, FILM COATED ORAL at 09:02

## 2020-12-04 NOTE — PROGRESS NOTES
D/C order noted for today. Orders reviewed. No needs identified at this time. Per pt, his wife is coming to pick him up.         TRISHA PalaciosN RN  Care Management  Pager: [phone]

## 2020-12-04 NOTE — DISCHARGE SUMMARY
[facility]  -  Progress Note      Discharge Summary     Patient: Renu Ponce MRN: [id number]  SSN: [id number]    YOB: 1960  Age: 56 y.o.   Sex: male       Admit Date: 12/2/2020    Discharge Date: 12/4/2020      Admission Diagnoses: Paroxysmal atrial fibrillation ([facility] [I48.0]    Discharge Diagnoses:   Problem List as of 12/4/2020 Date Reviewed: 11/20/2020          Codes Class Noted - Resolved    Atrial fibrillation ([facility] ICD-10-CM: I48.91  ICD-9-CM: 427.31  10/27/2020 - Present        Severe obesity ([facility] ICD-10-CM: E66.01  ICD-9-CM: 278.01  8/24/2020 - Present        Status post catheter ablation of atrial fibrillation ICD-10-CM: Z98.890  ICD-9-CM: V45.89  7/27/2020 - Present        Hypothyroidism ICD-10-CM: E03.9  ICD-9-CM: 244.9  7/27/2020 - Present        Paroxysmal atrial fibrillation ([facility] ICD-10-CM: I48.0  ICD-9-CM: 427.31  Unknown - Present    Overview Signed 10/22/2012  3:05 PM by Ciera Hernandez MD     s/p afib ablation             Dyslipidemia ICD-10-CM: E78.5  ICD-9-CM: 272.4  Unknown - Present        Sleep apnea ICD-10-CM: G47.30  ICD-9-CM: 780.57  Unknown - Present               Discharge Condition: Good    Hospital Course: Patient with symptomatic paroxysmal atrial fibrillation.  Status post initial atrial fibrillation ablation 2007.  Patient was back in atrial fibrillation again in July 2020 suspected for at least six or more months. [facility] poorly tolerates his arrhythmia due to fatigue and decreased activity tolerance.  Attempted cardioversion in September 2020 maintain sinus rhythm for less than 2 weeks.  He then underwent a repeat ablation at the end of October 2020 with a pulmonary vein isolation procedure using a cryocatheter.  Patient was back in atrial fibrillation 10 days later with similar symptoms.  He does not require rate controlling agents so does likely have a component of underlying conduction disease.  I have recommended admission with Tikosyn loading to see if we can maintain sinus rhythm.   I did advise the patient he may ultimately require a pacemaker if he develops significant bradycardia with antiarrhythmic therapy.      Patient tolerated Tikosyn loading well. Patient was monitored on tele for 48 hours on Tikosyn 500 BID. Patient converted to NSR. Patient with 1*AVB. No significant bradycardia. . Independently seen and evaluated. Agree with above. He has not had any significant pauses or bradycardic events. He has tolerated his Tikosyn without difficulty. Plan is to discharge the patient home on his new therapy. He will follow-up with his cardiologist, Dr. Carin Carter within the next 2-4 weeks. All questions answered. Consults: None    Significant Diagnostic Studies: labs:  Creatinine 1.21, Potassium 4.0, Mg 2.2    ECG 12/4/2020 0227  Ventricular Rate  77  BPM  Preliminary    Atrial Rate  77  BPM  Preliminary    P-R Interval  260  ms  Preliminary    QRS Duration  96  ms  Preliminary    Q-T Interval  328  ms  Preliminary    QTC Calculation (Bezet)  371  ms  Preliminary    Calculated P Axis  32  degrees  Preliminary    Calculated R Axis  37  degrees  Preliminary    Calculated T Axis  46  degrees  Preliminary    Diagnosis    Preliminary    Sinus rhythm with 1st degree AV block   Nonspecific T wave abnormality   Abnormal ECG   When compared with ECG of 03-DEC-2020 14:11,   QT has shortened          Disposition: home    Discharge Medications:   Current Discharge Medication List      START taking these medications    Details   dofetilide (TIKOSYN) 500 mcg capsule Take 1 Cap by mouth every twelve (12) hours every twelve (12) hours for 14 days. Qty: 28 Cap, Refills: 0         CONTINUE these medications which have NOT CHANGED    Details   rivaroxaban (Xarelto) 20 mg tab tablet Take 1 Tab by mouth daily.   Qty: 90 Tab, Refills: 3      synthroid 50 mcg tablet       fenofibrate nanocrystallized (TRICOR) 145 mg tablet       krill oil 500 mg cap Take  by mouth.      multivitamin (ONE A DAY) tablet Take 1 Tab by mouth daily. naproxen sodium (Aleve) 220 mg tablet Take 220 mg by mouth as needed.              Activity: Activity as tolerated   Diet: Cardiac Diet  Wound Care: As directed    Follow-up Appointments   Procedures    FOLLOW UP VISIT Appointment in: One Month Dr. Dmitriy Goode     Standing Status:   Standing     Number of Occurrences:   1     Order Specific Question:   Appointment in     Answer:   One Month       Signed By: CRISTINA Valencia     December 4, 2020

## 2020-12-04 NOTE — PROGRESS NOTES
Problem: Pain  Goal: *Control of Pain  Outcome: Progressing Towards Goal     Problem: Patient Education: Go to Patient Education Activity  Goal: Patient/Family Education  Outcome: Progressing Towards Goal     Problem: Falls - Risk of  Goal: *Absence of Falls  Description: Document Lj Broweron Fall Risk and appropriate interventions in the flowsheet.   Outcome: Progressing Towards Goal  Note: Fall Risk Interventions:            Medication Interventions: Teach patient to arise slowly                   Problem: Patient Education: Go to Patient Education Activity  Goal: Patient/Family Education  Outcome: Progressing Towards Goal     Problem: Arrhythmia Pathway (Adult)  Goal: *Absence of arrhythmia  Outcome: Progressing Towards Goal     Problem: Patient Education: Go to Patient Education Activity  Goal: Patient/Family Education  Outcome: Progressing Towards Goal

## 2020-12-04 NOTE — DISCHARGE INSTRUCTIONS
Patient Education        Learning About Atrial Fibrillation  What is atrial fibrillation? Atrial fibrillation (say \"AY-tree-amairani iha-tlpc-IAN-shun\") is the most common type of irregular heartbeat (arrhythmia). Normally, the heart beats in a strong, steady rhythm. In atrial fibrillation, a problem with the heart's electrical system causes the two upper parts of the heart (the atria) to quiver, or fibrillate. Your heart rate also may be faster than normal.  Atrial fibrillation can be dangerous because if the heartbeat isn't strong and steady, blood can collect, or pool, in the atria. And pooled blood is more likely to form clots. Clots can travel to the brain, block blood flow, and cause a stroke. Atrial fibrillation can also lead to heart failure. Treatment for atrial fibrillation helps prevent stroke and heart failure. It also helps relieve symptoms. Atrial fibrillation is often caused by another heart problem. It may happen after heart surgery. It may also be caused by other problems, such as an overactive thyroid gland or lung disease. Many people with atrial fibrillation are able to live full and active lives. What are the symptoms? Some people feel symptoms when they have episodes of atrial fibrillation. But other people don't notice any symptoms. If you have symptoms, you may feel:  · A fluttering, racing, or pounding feeling in your chest called palpitations. · Weak or tired. · Dizzy or lightheaded. · Short of breath. · Chest pain. · Confused. You may notice signs of atrial fibrillation when you check your pulse. Your pulse may seem uneven or fast.  What can you expect when you have atrial fibrillation? At first, spells of atrial fibrillation may come on suddenly and last a short time. It may go away on its own or it goes away after treatment. This is called paroxysmal atrial fibrillation. Over time, the spells may last longer and occur more often. They often don't go away on their own.   How is it treated? Treatments can help you feel better and prevent future problems, especially stroke and heart failure. The main types of treatment slow the heart rate, control the heart rhythm, and help prevent stroke. Your treatment will depend on the cause of your atrial fibrillation, your symptoms, and your risk for stroke. · Heart rate treatment. Medicine may be used to slow your heart rate. Your heartbeat may still be irregular. But these medicines keep your heart from beating too fast. They may also help relieve your symptoms. · Heart rhythm treatment. Different treatments may be used to try to stop atrial fibrillation and keep it from returning. They can also relieve symptoms. These treatments include medicine, electrical cardioversion to shock the heart back to a normal rhythm, a procedure called catheter ablation, and heart surgery. · Stroke prevention. You and your doctor can decide how to lower your risk. You may decide to take a blood-thinning medicine called an anticoagulant. How can you live well with it? You can live well and help manage atrial fibrillation by having a heart-healthy lifestyle. This lifestyle may help reduce how often you have episodes of atrial fibrillation. If you are overweight, losing weight can help relieve symptoms. To have a heart-healthy lifestyle:  · Don't smoke. · Eat heart-healthy foods. · Be active. Talk to your doctor about what type and level of exercise is safe for you. · Stay at a healthy weight. Lose weight if you need to. · Avoid alcohol if it triggers symptoms. · Manage other health problems such as high blood pressure, high cholesterol, and diabetes. · Avoid getting sick from the flu. Get a flu shot every year. · Manage stress. Where can you learn more? Go to http://www.gray.com/  Enter L274 in the search box to learn more about \"Learning About Atrial Fibrillation. \"  Current as of: December 16, 2019               Content Version: 12.6  © 9757-5177 [employer]. Care instructions adapted under license by [employer] (which disclaims liability or warranty for this information). If you have questions about a medical condition or this instruction, always ask your healthcare professional. [address] any warranty or liability for your use of this information.

## 2020-12-04 NOTE — ROUTINE PROCESS
Discharge instructions reviewed and signed by patient. Script filled by [employer]. Wife to  pt at front entrance. Pt taken downstairs via wheelchair by this nurse. No further questions or concerns at this time.

## 2020-12-04 NOTE — ROUTINE PROCESS
Bedside and Verbal shift change report given to Cheyenne Soto RN (oncoming nurse) by Precious Ortiz RN (offgoing nurse). Report included the following information SBAR, Kardex, Intake/Output, MAR and Recent Results.

## 2020-12-08 ENCOUNTER — HOSPITAL ENCOUNTER (OUTPATIENT)
Age: 60
Discharge: HOME | End: 2020-12-08
Attending: PHYSICIAN ASSISTANT
Payer: COMMERCIAL

## 2020-12-08 DIAGNOSIS — M65.4 DE QUERVAIN'S TENOSYNOVITIS, RIGHT: ICD-10-CM

## 2020-12-08 DIAGNOSIS — R22.31 MASS OF FOREARM, RIGHT: ICD-10-CM

## 2020-12-08 DIAGNOSIS — M18.10 ARTHRITIS OF CARPOMETACARPAL (CMC) JOINT OF THUMB: ICD-10-CM

## 2020-12-08 PROCEDURE — 73218 MRI UPPER EXTREMITY W/O DYE: CPT

## 2020-12-17 ENCOUNTER — OFFICE VISIT (OUTPATIENT)
Dept: CARDIOLOGY CLINIC | Age: 60
End: 2020-12-17
Payer: COMMERCIAL

## 2020-12-17 VITALS
DIASTOLIC BLOOD PRESSURE: 82 MMHG | WEIGHT: 283 LBS | HEIGHT: 75 IN | BODY MASS INDEX: 35.19 KG/M2 | HEART RATE: 84 BPM | OXYGEN SATURATION: 98 % | SYSTOLIC BLOOD PRESSURE: 140 MMHG

## 2020-12-17 DIAGNOSIS — G47.33 OBSTRUCTIVE SLEEP APNEA SYNDROME: ICD-10-CM

## 2020-12-17 DIAGNOSIS — E78.5 DYSLIPIDEMIA: ICD-10-CM

## 2020-12-17 DIAGNOSIS — R55 NEAR SYNCOPE: ICD-10-CM

## 2020-12-17 DIAGNOSIS — E66.01 SEVERE OBESITY (HCC): ICD-10-CM

## 2020-12-17 DIAGNOSIS — Z98.890 STATUS POST CATHETER ABLATION OF ATRIAL FIBRILLATION: ICD-10-CM

## 2020-12-17 DIAGNOSIS — I48.0 PAROXYSMAL ATRIAL FIBRILLATION (HCC): Primary | ICD-10-CM

## 2020-12-17 PROCEDURE — 93000 ELECTROCARDIOGRAM COMPLETE: CPT | Performed by: INTERNAL MEDICINE

## 2020-12-17 PROCEDURE — 99214 OFFICE O/P EST MOD 30 MIN: CPT | Performed by: INTERNAL MEDICINE

## 2020-12-17 RX ORDER — DOFETILIDE 0.5 MG/1
500 CAPSULE ORAL 2 TIMES DAILY
Qty: 180 CAP | Refills: 3 | Status: SHIPPED | OUTPATIENT
Start: 2020-12-17 | End: 2021-10-09

## 2020-12-17 NOTE — PROGRESS NOTES
Avelina Dandy presents today for   Chief Complaint   Patient presents with    Follow-up     post hosp.  Dizziness     constantly , lightheaded then has to catch himself for a few secs       Avelina Dandy preferred language for health care discussion is english/other. Is someone accompanying this pt? no    Is the patient using any DME equipment during [address]? no    Depression Screening:  3 most recent PHQ Screens 12/17/2020   Little interest or pleasure in doing things Not at all   Feeling down, depressed, irritable, or hopeless Not at all   Total Score PHQ 2 0       Learning Assessment:  Learning Assessment 11/20/2020   PRIMARY LEARNER Patient   PRIMARY LANGUAGE ENGLISH   LEARNER PREFERENCE PRIMARY DEMONSTRATION   ANSWERED BY patient   RELATIONSHIP SELF       Abuse Screening:  Abuse Screening Questionnaire 12/17/2020   Do you ever feel afraid of your partner? N   Are you in a relationship with someone who physically or mentally threatens you? N   Is it safe for you to go home? Y       Fall Risk  Fall Risk Assessment, last 12 mths 12/17/2020   Able to walk? Yes   Fall in past 12 months? No       Pt currently taking Anticoagulant therapy? Xarelto     Coordination of Care:  1. Have you been to the ER, urgent care clinic since your last visit? Hospitalized since your last visit? yes    2. Have you seen or consulted any other health care providers outside of the [address] [place] [address] since your last visit? Include any pap smears or colon screening.  no

## 2020-12-17 NOTE — PROGRESS NOTES
HISTORY OF PRESENT ILLNESS  Taisha Martines is a 56 y.o. male. Hospital Follow Up  Pertinent negatives include no chest pain, no abdominal pain, no headaches and no shortness of breath. Dizziness  Pertinent negatives include no chest pain, no abdominal pain, no headaches and no shortness of breath. Patient presents for a follow-up office visit. Patient has a past medical history significant for paroxysmal atrial fibrillation, status post atrial fibrillation ablation in 2007. The patient also has a history of obstructive sleep apnea on nightly CPAP, dyslipidemia and hypothyroidism. Patient was found to be back in atrial fibrillation in July 2020 suspected for least 6 to 9 months given his symptom onset. He has been very symptomatic to his atrial fibrillation in the past.  He was started on Xarelto for oral anticoagulation and underwent an elective cardioversion in September 2020, which was initially successful, however he converted back to atrial fibrillation within a few weeks and was feeling again short of breath fatigued and dizzy with decreased activity tolerance. He ultimately underwent a pulmonary vein isolation atrial for ablation procedure with Dr. Gwendolyn Hong at the end of October 2020. Approximately a week and a half later, he is found to be back in atrial fibrillation during a PCP follow-up visit. He underwent a follow-up echocardiogram earlier this month in November 2020 which showed preserved LV function, EF 55 to 60%, mild concentric LVH, mild right atrial and right ventricular shunt with normal PA pressures. Patient was hospitalized for elective dofetilide loading for 3 days at the beginning of December 2020. He did not have any proarrhythmic effects. His QTc interval remained normal.  He did convert back to sinus rhythm after 48 hours of the loading. He was discharged home from the hospital approximately 2 weeks ago.   1 week ago, he was driving in his car on the [place] and had a funny feeling in his chest which went up into his neck and had a refill dizzy and like he may pass out. The symptoms only lasted for 10 to 15 seconds at most, but he was concerned enough that he decided to drive himself to the hospital at [facility] where he was evaluated in the emergency room and released after undergoing a negative work-up which included EKGs, blood work and a CTA of his thorax. Since that episode, he states he has been feeling better. He has had no recurrent dizzy spells, feels like his shortness of breath and energy level has improved. No chest pain or pressure. Past Medical History:   Diagnosis Date    Crohn disease ([facility]     Dyslipidemia     History of cardiovascular stress test 09/2015    Normal stress echocardiogram    History of dizziness     Likely from autonomic insufficiency. Occasional brief loss of vision (10-15 secs).  History of echocardiogram 11/07/2012    EF 55-60%. No WMA. Normal RVSP/PASP.  Paroxysmal atrial fibrillation ([facility] 2007    s/p afib ablation    S/P ablation of atrial fibrillation 2007    Sleep apnea 2005-6    CPAP at night      Current Outpatient Medications   Medication Sig Dispense Refill    dofetilide (Tikosyn) 500 mcg capsule Take 1 Cap by mouth two (2) times a day. 180 Cap 3    rivaroxaban (Xarelto) 20 mg tab tablet Take 1 Tab by mouth daily. 90 Tab 3    synthroid 50 mcg tablet       fenofibrate nanocrystallized (TRICOR) 145 mg tablet       krill oil 500 mg cap Take  by mouth.  multivitamin (ONE A DAY) tablet Take 1 Tab by mouth daily.  naproxen sodium (Aleve) 220 mg tablet Take 220 mg by mouth as needed. Allergies   Allergen Reactions    Cardizem [Diltiazem Hcl] Other (comments)     Headache. Patient states he is not allergic to this medication. Review of Systems   Constitutional: Negative for chills, fever and weight loss. HENT: Negative for nosebleeds.     Eyes: Negative for blurred vision and double vision. Respiratory: Negative for cough, shortness of breath and wheezing. Cardiovascular: Negative for chest pain, palpitations, orthopnea, claudication, leg swelling and PND. Gastrointestinal: Negative for abdominal pain, heartburn, nausea and vomiting. Genitourinary: Negative for dysuria and hematuria. Musculoskeletal: Negative for falls and myalgias. Skin: Negative for rash. Neurological: Positive for dizziness. Negative for focal weakness and headaches. Endo/Heme/Allergies: Does not bruise/bleed easily. Psychiatric/Behavioral: Negative for substance abuse. Visit Vitals  BP (!) 140/82 (BP 1 Location: Right arm, BP Patient Position: Sitting)   Pulse 84   Ht 6' 3\" (1.905 m)   Wt 128.4 kg (283 lb)   SpO2 98%   BMI 35.37 kg/m²      Physical Exam   Constitutional: He is oriented to person, place, and time. He appears well-developed and well-nourished. HENT:   Head: Normocephalic and atraumatic. Eyes: Conjunctivae are normal.   Neck: Neck supple. No JVD present. Carotid bruit is not present. Cardiovascular: Normal rate, regular rhythm, S1 normal, S2 normal and normal pulses. Exam reveals distant heart sounds. Exam reveals no gallop and no S3. No murmur heard. Pulmonary/Chest: Breath sounds normal. He has no wheezes. He has no rales. Abdominal: Soft. Bowel sounds are normal. There is no abdominal tenderness. Musculoskeletal:         General: No tenderness, deformity or edema. Neurological: He is alert and oriented to person, place, and time. Skin: Skin is warm and dry. Psychiatric: He has a normal mood and affect. His behavior is normal. Thought content normal.     EKG:  normal sinus rhythm, first-degree AV block, occasional atrial premature contraction, normal QTc interval, nonspecific T wave abnormality. Compared to the previous EKG, APCs is now present and nonspecific T wave abnormality is slightly more pronounced.     ASSESSMENT and PLAN    Paroxysmal atrial fibrillation. Status post initial atrial fibrillation ablation 2007. Patient was back in atrial fibrillation again in July 2020 suspected for at least six or more months. He poorly tolerates his arrhythmia due to fatigue and decreased activity tolerance. Attempted cardioversion in September 2020 maintain sinus rhythm for less than 2 weeks. He then underwent a repeat ablation at the end of October 2020 with a pulmonary vein isolation procedure using a cryocatheter. Patient was back in atrial fibrillation 10 days later with similar symptoms. He recently underwent elective Tikosyn loading in the hospital to begin of December 2020 and did convert back to sinus rhythm. I would continue this current dosage along with his Xarelto for oral anticoagulation. He did have a single episode of dizziness/near syncope 1 week ago, so I have recommended a 30-day event monitor for further evaluation. He was evaluated and released in the ER for that episode. Dyslipidemia. Patient primarily has elevated triglycerides and he has been managed with fenofibrate and krill oil by his PCP. Hypothyroidism. Patient is been on a stable dose of Synthroid for many years now. Patient scheduled to have repeat blood work next week. Obstructive sleep apnea. On nightly CPAP. Obesity. Patient reports that his weight has been fairly stable over the past 6 months. I have recommended trying to lose at least 20 to 30 pounds of weight with lifestyle modification. Follow-up in 3 months, sooner if needed.

## 2020-12-17 NOTE — PATIENT INSTRUCTIONS
Paty Greer will be calling you to confirm your address to send your Heart Monitor. Please allow 7-10 business days for monitor to arrive at your home. Customer Service for [employer]: [phone].

## 2021-03-29 ENCOUNTER — OFFICE VISIT (OUTPATIENT)
Dept: CARDIOLOGY CLINIC | Age: 61
End: 2021-03-29
Payer: COMMERCIAL

## 2021-03-29 VITALS
SYSTOLIC BLOOD PRESSURE: 150 MMHG | WEIGHT: 281 LBS | DIASTOLIC BLOOD PRESSURE: 90 MMHG | OXYGEN SATURATION: 97 % | HEART RATE: 80 BPM | HEIGHT: 75 IN | BODY MASS INDEX: 34.94 KG/M2

## 2021-03-29 DIAGNOSIS — E78.5 DYSLIPIDEMIA: ICD-10-CM

## 2021-03-29 DIAGNOSIS — I48.0 PAROXYSMAL ATRIAL FIBRILLATION (HCC): Primary | ICD-10-CM

## 2021-03-29 DIAGNOSIS — G47.33 OBSTRUCTIVE SLEEP APNEA SYNDROME: ICD-10-CM

## 2021-03-29 DIAGNOSIS — I10 ESSENTIAL HYPERTENSION: ICD-10-CM

## 2021-03-29 DIAGNOSIS — E03.9 HYPOTHYROIDISM, UNSPECIFIED TYPE: ICD-10-CM

## 2021-03-29 DIAGNOSIS — E66.01 SEVERE OBESITY (HCC): ICD-10-CM

## 2021-03-29 PROCEDURE — 93000 ELECTROCARDIOGRAM COMPLETE: CPT | Performed by: INTERNAL MEDICINE

## 2021-03-29 PROCEDURE — 99214 OFFICE O/P EST MOD 30 MIN: CPT | Performed by: INTERNAL MEDICINE

## 2021-03-29 RX ORDER — TELMISARTAN 20 MG/1
20 TABLET ORAL DAILY
Qty: 30 TAB | Refills: 2 | Status: SHIPPED | OUTPATIENT
Start: 2021-03-29 | End: 2021-06-21

## 2021-03-29 NOTE — PROGRESS NOTES
HISTORY OF PRESENT ILLNESS  Preet Lee is a 56 y.o. male. Follow-up  Pertinent negatives include no chest pain, no abdominal pain, no headaches and no shortness of breath. Patient presents for a follow-up office visit. Patient has a past medical history significant for paroxysmal atrial fibrillation, status post atrial fibrillation ablation in 2007. The patient also has a history of obstructive sleep apnea on nightly CPAP, dyslipidemia and hypothyroidism. Patient was found to be back in atrial fibrillation in July 2020 suspected for least 6 to 9 months given his symptom onset. He has been very symptomatic to his atrial fibrillation in the past.  He was started on Xarelto for oral anticoagulation and underwent an elective cardioversion in September 2020, which was initially successful, however he converted back to atrial fibrillation within a few weeks and was feeling again short of breath fatigued and dizzy with decreased activity tolerance. He ultimately underwent a pulmonary vein isolation atrial for ablation procedure with Dr. Arlen Trinh at the end of October 2020. Approximately a week and a half later, he is found to be back in atrial fibrillation during a PCP follow-up visit. He underwent a follow-up echocardiogram earlier this month in November 2020 which showed preserved LV function, EF 55 to 60%, mild concentric LVH, mild right atrial and right ventricular shunt with normal PA pressures. Patient was hospitalized for elective dofetilide loading for 3 days at the beginning of December 2020. He did not have any proarrhythmic effects. His QTc interval remained normal.  He did convert back to sinus rhythm after 48 hours of the loading. Patient was last seen in our office approximately 3 to 4 months ago. At last visit he did have a dizzy spell while driving, but has had no recurrent symptoms since that time.   He wore a 30-day event monitor in January 2021 which showed normal sinus rhythm for the most part. He was in atrial fibrillation 8% of the time the majority of the time his atrial fibrillation rates were well controlled. His average heart rate while in atrial fibrillation was 89 beats per minutes he did not have any severe dizzy spells or prolonged heart palpitations while he wore the monitor. He states he is feeling much better than he was at last visit. He has been checking his blood pressure and states that 90% of his readings are elevated with systolics in the 647A and 150s. Past Medical History:   Diagnosis Date    [facility] ([facility]     Dyslipidemia     History of cardiovascular stress test 09/2015    Normal stress echocardiogram    History of dizziness     Likely from autonomic insufficiency. Occasional brief loss of vision (10-15 secs).  History of echocardiogram 11/07/2012    EF 55-60%. No WMA. Normal RVSP/PASP.  Paroxysmal atrial fibrillation ([facility] 2007    s/p afib ablation    S/P ablation of atrial fibrillation 2007    Sleep apnea 2005-6    CPAP at night      Current Outpatient Medications   Medication Sig Dispense Refill    telmisartan (MICARDIS) 20 mg tablet Take 1 Tab by mouth daily. 30 Tab 2    dofetilide (Tikosyn) 500 mcg capsule Take 1 Cap by mouth two (2) times a day. 180 Cap 3    rivaroxaban (Xarelto) 20 mg tab tablet Take 1 Tab by mouth daily. 90 Tab 3    synthroid 50 mcg tablet       fenofibrate nanocrystallized (TRICOR) 145 mg tablet       krill oil 500 mg cap Take  by mouth.  multivitamin (ONE A DAY) tablet Take 1 Tab by mouth daily.  naproxen sodium (Aleve) 220 mg tablet Take 220 mg by mouth as needed. Allergies   Allergen Reactions    Cardizem [Diltiazem Hcl] Other (comments)     Headache. Patient states he is not allergic to this medication. Review of Systems   Constitutional: Negative for chills, fever and weight loss. HENT: Negative for nosebleeds.     Eyes: Negative for blurred vision and double vision. Respiratory: Negative for cough, shortness of breath and wheezing. Cardiovascular: Negative for chest pain, palpitations, orthopnea, claudication, leg swelling and PND. Gastrointestinal: Negative for abdominal pain, heartburn, nausea and vomiting. Genitourinary: Negative for dysuria and hematuria. Musculoskeletal: Negative for falls and myalgias. Skin: Negative for rash. Neurological: Negative for dizziness, focal weakness and headaches. Endo/Heme/Allergies: Does not bruise/bleed easily. Psychiatric/Behavioral: Negative for substance abuse. Visit Vitals  BP (!) 150/90 (BP 1 Location: Right arm, BP Patient Position: Sitting, BP Cuff Size: Adult)   Pulse 80   Ht 6' 3\" (1.905 m)   Wt 127.5 kg (281 lb)   SpO2 97%   BMI 35.12 kg/m²      Physical Exam   Constitutional: He is oriented to person, place, and time. He appears well-developed and well-nourished. HENT:   Head: Normocephalic and atraumatic. Eyes: Conjunctivae are normal.   Neck: Neck supple. No JVD present. Carotid bruit is not present. Cardiovascular: Normal rate, regular rhythm, S1 normal, S2 normal and normal pulses. Exam reveals distant heart sounds. Exam reveals no gallop and no S3. No murmur heard. Pulmonary/Chest: Breath sounds normal. He has no wheezes. He has no rales. Abdominal: Soft. Bowel sounds are normal. There is no abdominal tenderness. Musculoskeletal:         General: No tenderness, deformity or edema. Neurological: He is alert and oriented to person, place, and time. Skin: Skin is warm and dry. Psychiatric: He has a normal mood and affect. His behavior is normal. Thought content normal.     EKG:  Normal sinus rhythm, first-degree AV block, occasional atrial premature contraction, normal QTc interval, nonspecific T wave abnormality. Compared to the previous EKG, no significant overall change. ASSESSMENT and PLAN    Paroxysmal atrial fibrillation.   Status post initial atrial fibrillation ablation in 2007. Patient was back in atrial fibrillation again in July 2020 suspected for at least six or more months. He poorly tolerates his arrhythmia due to fatigue and decreased activity tolerance. Attempted cardioversion in September 2020 maintain sinus rhythm for less than 2 weeks. He then underwent a repeat ablation at the end of October 2020 with a pulmonary vein isolation procedure using a cryocatheter. Patient was back in atrial fibrillation 10 days later with similar symptoms. Patient then underwent elective Tikosyn loading in the hospital to begin of December 2020 and did convert back to sinus rhythm. I would continue this current dosage along with his Xarelto for oral anticoagulation. Patient completed a 30-day event monitor in January 2021 to evaluate a near syncopal episode. He was asymptomatic during the 30 days. He was in sinus rhythm 92% of the time. Atrial fibrillation 8% of the time with average heart rate during atrial fibrillation episodes at 89 bpm.  I will continue his current regimen long-term. Essential hypertension. Recent diagnosis for the patient. He states he has been checking regularly over the past few months and his systolic readings are typically in the 140s and 150s. I have recommended starting him on low-dose Micardis at 20 mg daily. Dyslipidemia. Patient primarily has elevated triglycerides and he has been managed with fenofibrate and krill oil by his PCP. Hypothyroidism. Patient is been on a stable dose of Synthroid for many years now. Patient scheduled to have repeat blood work next week. Obstructive sleep apnea. On nightly CPAP. Obesity. Patient reports that his weight has been fairly stable over the past 6 months. I have recommended trying to lose at least 20 to 30 pounds of weight with lifestyle modification. Follow-up in 6 months, sooner if needed.

## 2021-03-29 NOTE — PATIENT INSTRUCTIONS
Follow up with Dr Caridad Renee with EKG in 6 months Start taking Micardis 20mg one tablet by mouth daily Call us in one month with blood pressure readings, if these are normal we will continue with the current medication regimen

## 2021-06-21 RX ORDER — TELMISARTAN 20 MG/1
TABLET ORAL
Qty: 30 TABLET | Refills: 6 | Status: SHIPPED | OUTPATIENT
Start: 2021-06-21 | End: 2021-07-20 | Stop reason: DRUGHIGH

## 2021-07-14 ENCOUNTER — TRANSCRIBE ORDER (OUTPATIENT)
Dept: SCHEDULING | Age: 61
End: 2021-07-14

## 2021-07-14 DIAGNOSIS — S46.212A: Primary | ICD-10-CM

## 2021-07-15 ENCOUNTER — HOSPITAL ENCOUNTER (OUTPATIENT)
Age: 61
Discharge: HOME | End: 2021-07-15
Attending: PHYSICIAN ASSISTANT
Payer: COMMERCIAL

## 2021-07-15 DIAGNOSIS — S46.212A: ICD-10-CM

## 2021-07-15 PROCEDURE — 73221 MRI JOINT UPR EXTREM W/O DYE: CPT

## 2021-07-20 ENCOUNTER — TELEPHONE (OUTPATIENT)
Dept: CARDIOLOGY CLINIC | Age: 61
End: 2021-07-20

## 2021-07-20 RX ORDER — TELMISARTAN 40 MG/1
40 TABLET ORAL DAILY
Qty: 30 TABLET | Refills: 6 | Status: SHIPPED | OUTPATIENT
Start: 2021-07-20 | End: 2021-09-10 | Stop reason: SDUPTHER

## 2021-07-20 NOTE — TELEPHONE ENCOUNTER
Patient brought in list of blood pressure and heart rate readings. This was reviewed by Dr. Magi Schaffer. Verbal order and read back per Audrey Parisi MD  - instruct patient to increase the Telmisartan to 40mg daily and continue with blood pressure records  - bring blood pressure readings record to next office visit in September. This has been fully explained to the patient, who indicates understanding.

## 2021-09-07 ENCOUNTER — OFFICE VISIT (OUTPATIENT)
Dept: CARDIOLOGY CLINIC | Age: 61
End: 2021-09-07
Payer: COMMERCIAL

## 2021-09-07 VITALS
OXYGEN SATURATION: 97 % | DIASTOLIC BLOOD PRESSURE: 70 MMHG | HEIGHT: 75 IN | SYSTOLIC BLOOD PRESSURE: 138 MMHG | HEART RATE: 77 BPM | BODY MASS INDEX: 35.06 KG/M2 | WEIGHT: 282 LBS

## 2021-09-07 DIAGNOSIS — I48.0 PAROXYSMAL ATRIAL FIBRILLATION (HCC): Primary | ICD-10-CM

## 2021-09-07 DIAGNOSIS — G47.33 OBSTRUCTIVE SLEEP APNEA SYNDROME: ICD-10-CM

## 2021-09-07 DIAGNOSIS — E78.5 DYSLIPIDEMIA: ICD-10-CM

## 2021-09-07 DIAGNOSIS — I10 ESSENTIAL HYPERTENSION: ICD-10-CM

## 2021-09-07 DIAGNOSIS — E66.01 SEVERE OBESITY (HCC): ICD-10-CM

## 2021-09-07 PROCEDURE — 99214 OFFICE O/P EST MOD 30 MIN: CPT | Performed by: INTERNAL MEDICINE

## 2021-09-07 PROCEDURE — 93000 ELECTROCARDIOGRAM COMPLETE: CPT | Performed by: INTERNAL MEDICINE

## 2021-09-07 NOTE — PROGRESS NOTES
HISTORY OF PRESENT ILLNESS  Jacqui Tuttle is a 56 y.o. male. Follow-up  Associated symptoms include shortness of breath. Pertinent negatives include no chest pain, no abdominal pain and no headaches. Patient presents for a follow-up office visit. Patient has a past medical history significant for paroxysmal atrial fibrillation, status post atrial fibrillation ablation in 2007. The patient also has a history of obstructive sleep apnea on nightly CPAP, dyslipidemia and hypothyroidism. Patient was found to be back in atrial fibrillation in July 2020 suspected for least 6 to 9 months given his symptom onset. He has been very symptomatic to his atrial fibrillation in the past.  He was started on Xarelto for oral anticoagulation and underwent an elective cardioversion in September 2020, which was initially successful, however he converted back to atrial fibrillation within a few weeks and was feeling again short of breath fatigued and dizzy with decreased activity tolerance. He ultimately underwent a pulmonary vein isolation atrial for ablation procedure with Dr. Anthony Keith at the end of October 2020. Approximately a week and a half later, he is found to be back in atrial fibrillation during a PCP follow-up visit. He underwent a follow-up echocardiogram earlier this month in November 2020 which showed preserved LV function, EF 55 to 60%, mild concentric LVH, mild right atrial and right ventricular shunt with normal PA pressures. Patient was hospitalized for elective dofetilide loading for 3 days at the beginning of December 2020. He did not have any proarrhythmic effects. His QTc interval remained normal.  He did convert back to sinus rhythm after 48 hours of the loading. He then wore a 30-day event monitor in January 2021 which showed normal sinus rhythm for the most part. He was in atrial fibrillation 8% of the time the majority of the time his atrial fibrillation rates were well controlled. He was last seen in our office approximately 5 months ago. Since last visit he states his been feeling well. He states his heart palpitations have been minimal at best.  Assessment chronic exertional dyspnea which has not changed in severity. No leg swelling, orthopnea, PND or weight gain. He has been checking his blood pressure regularly at home and states his readings are still borderline high with systolic readings in the 849L 150s occasionally with diastolic readings in the upper 80s. Past Medical History:   Diagnosis Date    [facility]     Dyslipidemia     History of cardiovascular stress test 09/2015    Normal stress echocardiogram    History of dizziness     Likely from autonomic insufficiency. Occasional brief loss of vision (10-15 secs).  History of echocardiogram 11/07/2012    EF 55-60%. No WMA. Normal RVSP/PASP.  Parox[facility] ([facility] 2007    s/p afib ablation    S/P ablation of atrial fibrillation 2007    Sleep apnea 2005-6    CPAP at night      Current Outpatient Medications   Medication Sig Dispense Refill    telmisartan (MICARDIS) 40 mg tablet Take 1 Tablet by mouth daily. 30 Tablet 6    dofetilide (Tikosyn) 500 mcg capsule Take 1 Cap by mouth two (2) times a day. 180 Cap 3    rivaroxaban (Xarelto) 20 mg tab tablet Take 1 Tab by mouth daily. 90 Tab 3    synthroid 50 mcg tablet       fenofibrate nanocrystallized (TRICOR) 145 mg tablet       krill oil 500 mg cap Take  by mouth.  multivitamin (ONE A DAY) tablet Take 1 Tab by mouth daily.  naproxen sodium (Aleve) 220 mg tablet Take 220 mg by mouth as needed. Allergies   Allergen Reactions    Cardizem [Diltiazem Hcl] Other (comments)     Headache. Patient states he is not allergic to this medication. Review of Systems   Constitutional: Negative for chills, fever and weight loss. HENT: Negative for nosebleeds. Eyes: Negative for blurred vision and double vision. Respiratory: Positive for shortness of breath. Negative for cough and wheezing. Cardiovascular: Positive for palpitations. Negative for chest pain, orthopnea, claudication, leg swelling and PND. Gastrointestinal: Negative for abdominal pain, heartburn, nausea and vomiting. Genitourinary: Negative for dysuria and hematuria. Musculoskeletal: Negative for falls and myalgias. Skin: Negative for rash. Neurological: Negative for dizziness, focal weakness and headaches. Endo/Heme/Allergies: Does not bruise/bleed easily. Psychiatric/Behavioral: Negative for substance abuse. Visit Vitals  /70 (BP 1 Location: Left upper arm, BP Patient Position: Sitting, BP Cuff Size: Adult)   Pulse 77   Ht 6' 3\" (1.905 m)   Wt 127.9 kg (282 lb)   SpO2 97%   BMI 35.25 kg/m²      Physical Exam  Constitutional:       Appearance: He is well-developed. HENT:      Head: Normocephalic and atraumatic. Eyes:      Conjunctiva/sclera: Conjunctivae normal.   Neck:      Vascular: No carotid bruit or JVD. Cardiovascular:      Rate and Rhythm: Normal rate and regular rhythm. Pulses: Normal pulses. Heart sounds: S1 normal and S2 normal. Heart sounds are distant. No murmur heard. No gallop. No S3 sounds. Pulmonary:      Breath sounds: Normal breath sounds. No wheezing or rales. Abdominal:      General: Bowel sounds are normal.      Palpations: Abdomen is soft. Tenderness: There is no abdominal tenderness. Musculoskeletal:         General: No swelling, tenderness or deformity. Cervical back: Neck supple. Skin:     General: Skin is warm and dry. Neurological:      Mental Status: He is alert and oriented to person, place, and time. Psychiatric:         Behavior: Behavior normal.         Thought Content: Thought content normal.       EKG:  Normal sinus rhythm, first-degree AV block, normal QTc interval, nonspecific T wave abnormality.   Compared to the previous EKG, PVCs are no longer present. ASSESSMENT and PLAN    Paroxysmal atrial fibrillation. Status post initial atrial fibrillation ablation in 2007. Patient was back in atrial fibrillation again in July 2020 suspected for at least six or more months. He poorly tolerates his arrhythmia due to fatigue and decreased activity tolerance. Attempted cardioversion in September 2020 maintain sinus rhythm for less than 2 weeks. He then underwent a repeat ablation at the end of October 2020 with a pulmonary vein isolation procedure using a cryocatheter. Patient was back in atrial fibrillation 10 days later with similar symptoms. Patient then underwent elective Tikosyn loading in the hospital to begin of December 2020 and did convert back to sinus rhythm. Patient completed a 30-day event monitor in January 2021 to evaluate a near syncopal episode. He was asymptomatic during the 30 days. He was in sinus rhythm 92% of the time. Atrial fibrillation 8% of the time with average heart rate during atrial fibrillation episodes at 89 bpm.  Minimal palpitations since last visit. I would continue his current dose of Tikosyn and Xarelto long-term for oral anticoagulation. Essential hypertension. Patient is currently taking telmisartan 40 mg daily. He reports that his blood pressure has still been borderline elevated at home. He has been recording his readings at home and will fax them to our office for me to review. If the majority of his readings are elevated, I would increase his telmisartan to 80 mg daily. Dyslipidemia. Patient primarily has elevated triglycerides and he has been managed with fenofibrate and krill oil by his PCP. Obstructive sleep apnea. Patient remains compliant with his nightly CPAP. Obesity. No significant change in weight over the past 6 months. He was encouraged to try and lose much weight as possible lifestyle modification. Follow-up in 6 months, sooner if needed.

## 2021-09-07 NOTE — PROGRESS NOTES
Abdelrahmanshannan Ruffanette presents today for   Chief Complaint   Patient presents with    Follow-up     6 month follow up        Jannette Izquierdo preferred language for health care discussion is english/other. Is someone accompanying this pt? no    Is the patient using any DME equipment during [address]? no    Depression Screening:  3 most recent PHQ Screens 9/7/2021   Little interest or pleasure in doing things Not at all   Feeling down, depressed, irritable, or hopeless Not at all   Total Score PHQ 2 0       Learning Assessment:  Learning Assessment 11/20/2020   PRIMARY LEARNER Patient   PRIMARY LANGUAGE ENGLISH   LEARNER PREFERENCE PRIMARY DEMONSTRATION   ANSWERED BY patient   RELATIONSHIP SELF       Abuse Screening:  Abuse Screening Questionnaire 12/17/2020   Do you ever feel afraid of your partner? N   Are you in a relationship with someone who physically or mentally threatens you? N   Is it safe for you to go home? Y       Fall Risk  Fall Risk Assessment, last 12 mths 12/17/2020   Able to walk? Yes   Fall in past 12 months? No       Pt currently taking Anticoagulant therapy? Xarelto     Coordination of Care:  1. Have you been to the ER, urgent care clinic since your last visit? Hospitalized since your last visit? no    2. Have you seen or consulted any other health care providers outside of the [address] [place] [address] since your last visit? Include any pap smears or colon screening.  no

## 2021-09-10 ENCOUNTER — TELEPHONE (OUTPATIENT)
Dept: CARDIOLOGY CLINIC | Age: 61
End: 2021-09-10

## 2021-09-10 RX ORDER — TELMISARTAN 40 MG/1
40 TABLET ORAL DAILY
Qty: 90 TABLET | Refills: 3 | Status: SHIPPED | OUTPATIENT
Start: 2021-09-10 | End: 2022-07-15

## 2021-09-10 NOTE — TELEPHONE ENCOUNTER
Patient's blood pressure reading received and reviewed by Dr. Nikolai Abreu. Verbal order and read back per Tierney Rodriguez MD  - continue Telmisartan 40mg daily  This has been fully explained to the patient, who indicates understanding.

## 2021-10-09 RX ORDER — DOFETILIDE 0.5 MG/1
CAPSULE ORAL
Qty: 180 CAPSULE | Refills: 0 | Status: SHIPPED | OUTPATIENT
Start: 2021-10-09 | End: 2021-12-21 | Stop reason: SDUPTHER

## 2021-10-19 RX ORDER — RIVAROXABAN 20 MG/1
TABLET, FILM COATED ORAL
Qty: 90 TABLET | Refills: 3 | Status: SHIPPED | OUTPATIENT
Start: 2021-10-19 | End: 2022-10-25

## 2021-12-21 RX ORDER — DOFETILIDE 0.5 MG/1
500 CAPSULE ORAL 2 TIMES DAILY
Qty: 180 CAPSULE | Refills: 3 | Status: SHIPPED | OUTPATIENT
Start: 2021-12-21

## 2022-03-04 ENCOUNTER — TELEPHONE (OUTPATIENT)
Dept: CARDIOLOGY CLINIC | Age: 62
End: 2022-03-04

## 2022-03-04 NOTE — TELEPHONE ENCOUNTER
Received call from [facility] at [address] specialist requesting clearance for patient to hold his Xarelto prior to surgery 3/7/22. Discussed with Dr. Arlen Mondragon and record reviewed. Verbal order and read back per Emmanuel Mi MD  - patient may hold his Xarelto for 2-3 days prior to surgery. Gregoria surgical scheduler at [address] [facility] made aware.    Patient's surgeon Dr. Reji Zelaya  Phone # [phone]

## 2022-03-14 ENCOUNTER — OFFICE VISIT (OUTPATIENT)
Dept: CARDIOLOGY CLINIC | Age: 62
End: 2022-03-14
Payer: COMMERCIAL

## 2022-03-14 VITALS
HEIGHT: 75 IN | SYSTOLIC BLOOD PRESSURE: 110 MMHG | WEIGHT: 260 LBS | DIASTOLIC BLOOD PRESSURE: 64 MMHG | BODY MASS INDEX: 32.33 KG/M2 | OXYGEN SATURATION: 95 % | HEART RATE: 86 BPM

## 2022-03-14 DIAGNOSIS — E78.5 DYSLIPIDEMIA: ICD-10-CM

## 2022-03-14 DIAGNOSIS — I10 ESSENTIAL HYPERTENSION: ICD-10-CM

## 2022-03-14 DIAGNOSIS — Z98.890 STATUS POST CATHETER ABLATION OF ATRIAL FIBRILLATION: ICD-10-CM

## 2022-03-14 DIAGNOSIS — I48.0 PAROXYSMAL ATRIAL FIBRILLATION (HCC): Primary | ICD-10-CM

## 2022-03-14 PROCEDURE — 99214 OFFICE O/P EST MOD 30 MIN: CPT | Performed by: INTERNAL MEDICINE

## 2022-03-14 PROCEDURE — 93000 ELECTROCARDIOGRAM COMPLETE: CPT | Performed by: INTERNAL MEDICINE

## 2022-03-18 PROBLEM — E66.01 SEVERE OBESITY (HCC): Status: ACTIVE | Noted: 2020-08-24

## 2022-03-19 PROBLEM — E03.9 HYPOTHYROIDISM: Status: ACTIVE | Noted: 2020-07-27

## 2022-03-19 PROBLEM — I10 ESSENTIAL HYPERTENSION: Status: ACTIVE | Noted: 2021-03-29

## 2022-03-19 PROBLEM — Z98.890 STATUS POST CATHETER ABLATION OF ATRIAL FIBRILLATION: Status: ACTIVE | Noted: 2020-07-27

## 2022-03-19 PROBLEM — I48.91 ATRIAL FIBRILLATION (HCC): Status: ACTIVE | Noted: 2020-10-27

## 2022-07-15 RX ORDER — TELMISARTAN 40 MG/1
TABLET ORAL
Qty: 90 TABLET | Refills: 3 | Status: SHIPPED | OUTPATIENT
Start: 2022-07-15

## 2022-08-29 ENCOUNTER — OFFICE VISIT (OUTPATIENT)
Dept: ORTHOPEDIC SURGERY | Age: 62
End: 2022-08-29
Payer: COMMERCIAL

## 2022-08-29 VITALS
WEIGHT: 281 LBS | TEMPERATURE: 98.2 F | OXYGEN SATURATION: 97 % | HEART RATE: 83 BPM | BODY MASS INDEX: 34.94 KG/M2 | HEIGHT: 75 IN

## 2022-08-29 DIAGNOSIS — S46.311A RUPTURE OF RIGHT TRICEPS TENDON, INITIAL ENCOUNTER: Primary | ICD-10-CM

## 2022-08-29 PROCEDURE — 99203 OFFICE O/P NEW LOW 30 MIN: CPT | Performed by: ORTHOPAEDIC SURGERY

## 2022-08-29 PROCEDURE — 29105 APPLICATION LONG ARM SPLINT: CPT | Performed by: ORTHOPAEDIC SURGERY

## 2022-08-29 NOTE — PROGRESS NOTES
Bee Beth  1960   Chief Complaint   Patient presents with    Elbow Pain     Right elbow pain          HISTORY OF PRESENT ILLNESS  Bee Beth is a 59 y.o. male who presents today for evaluation of right elbow. He rates his pain 6/10 today. Pain has been present for around 2 weeks. He was snorkeling when he fell backwards while in the water and hit the elbow on a hard rock/surface. Notes swelling. Reports pain radiates down into his fingers. Went to his PCP Dr. Hayden Pruett who obtained XR. Endorses significant night pain and difficulty with sleeping. Pain has worsened since initial onset. Has tried following treatments: Injections:NO; Brace:NO; Therapy:NO; Cane/Crutch:NO       Allergies   Allergen Reactions    Cardizem [Diltiazem Hcl] Other (comments)     Headache. Patient states he is not allergic to this medication. Past Medical History:   Diagnosis Date    Crohn disease ([facility] [address]     Dyslipidemia     History of cardiovascular stress test 09/2015    Normal stress echocardiogram    History of dizziness     Likely from autonomic insufficiency. Occasional brief loss of vision (10-15 secs). History of echocardiogram 11/07/2012    EF 55-60%. No WMA. Normal RVSP/PASP.       Paroxysmal atrial fibrillation ([facility] [address] 2007    s/p afib ablation    S/P ablation of atrial fibrillation 2007    Sleep apnea 2005-6    CPAP at night    Thyroid disease       Social History     Socioeconomic History    Marital status:      Spouse name: Not on file    Number of children: Not on file    Years of education: Not on file    Highest education level: Not on file   Occupational History    Not on file   Tobacco Use    Smoking status: Never    Smokeless tobacco: Never   Vaping Use    Vaping Use: Never used   Substance and Sexual Activity    Alcohol use: No    Drug use: Never    Sexual activity: Not on file   Other Topics Concern    Not on file   Social History Narrative    Not on file     Social Determinants of Health Financial Resource Strain: Not on file   Food Insecurity: Not on file   Transportation Needs: Not on file   Physical Activity: Insufficiently Active    Days of Exercise per Week: 3 days    Minutes of Exercise per Session: 30 min   Stress: Not on file   Social Connections: Not on file   Intimate Partner Violence: Not At Risk    Fear of Current or Ex-Partner: No    Emotionally Abused: No    Physically Abused: No    Sexually Abused: No   Housing Stability: Not on file      Past Surgical History:   Procedure Laterality Date    HX AFIB ABLATION      HX HERNIA REPAIR      X2    HX OTHER SURGICAL      arms    HX OTHER SURGICAL      Toe on left foot reattached after accident. HX TONSILLECTOMY      NM CARDIOVERSION ELECTIVE ARRHYTHMIA EXTERNAL N/A 9/17/2020    EP CARDIOVERSION performed by Maura Rivas MD at [facility] CATH LAB    NM COMPRE ELECTROPHYSIOL XM W/LEFT ATRIAL PACNG/REC N/A 10/27/2020    Lt Atrial Pace & Record During Ep Study performed by Radha Holt MD at [facility] CATH LAB    NM COMPRE EP EVAL ABLTJ ATR FIB PULM VEIN ISOLATION N/A 10/27/2020    ABLATION A-FIB  W COMPLETE EP STUDY performed by Radha Holt MD at [facility] CATH LAB      No family history on file. Current Outpatient Medications   Medication Sig    telmisartan (MICARDIS) 40 mg tablet TAKE 1 TABLET DAILY    dofetilide (TIKOSYN) 500 mcg capsule Take 1 Capsule by mouth two (2) times a day. Xarelto 20 mg tab tablet TAKE 1 TABLET DAILY    synthroid 50 mcg tablet Take 50 mcg by mouth Daily (before breakfast). fenofibrate nanocrystallized (TRICOR) 145 mg tablet Take 145 mg by mouth daily. krill oil 500 mg cap Take 1 Capsule by mouth daily. multivitamin (ONE A DAY) tablet Take 1 Tab by mouth daily. naproxen sodium (Aleve) 220 mg tablet Take 220 mg by mouth as needed. No current facility-administered medications for this visit.        REVIEW OF SYSTEM   Patient denies: Weight loss, Fever/Chills, HA, Visual changes, Fatigue, Chest pain, SOB, Abdominal pain, N/V/D/C, Blood in stool or urine, Edema. Pertinent positive as above in HPI. All others were negative    PHYSICAL EXAM:   Visit Vitals  Pulse 83   Temp 98.2 °F (36.8 °C) (Temporal)   Ht 6' 3\" (1.905 m)   Wt 281 lb (127.5 kg)   SpO2 97%   BMI 35.12 kg/m²     The patient is a well-developed, well-nourished male   in no acute distress. The patient is alert and oriented times three. The patient is alert and oriented times three. Mood and affect are normal.  LYMPHATIC: lymph nodes are not enlarged and are within normal limits  SKIN: normal in color and non tender to palpation. There are no bruises or abrasions noted. NEUROLOGICAL: Motor sensory exam is within normal limits. Reflexes are equal bilaterally. There is normal sensation to pinprick and light touch  MUSCULOSKELETAL:  Examination Right Elbow   Skin Intact   Range of Motion    Tenderness Medial Epicondyle -   Tenderness Lateral Epicondyle -   Tenderness Olecranon Bursa +   Swelling +   Bruising -   Stability Normal   Motor Strength  Normal   Neurovascular Intact         IMAGING: XR of the right elbow obtained at PCP Dr. Monika Resendez office brought in on disc by patient dated 8/28/2022 was reviewed and read by Dr. Miley Montoya: [place] over the olecranon, question of small avulsion      IMPRESSION:      ICD-10-CM ICD-9-CM    1. Rupture of right triceps tendon, initial encounter  S46.311A 840.8            PLAN:  1. Pt presents today with right elbow pain with questionable avulsion of olecranon seen on XR and symptoms c/w possible triceps tendon rupture. Will be ordering STAT MRI of the elbow to r/o triceps tendon rupture. Was placed in long arm splint in the office today. Risk factors include: BMI>30  2. No ultrasound exam indicated today  3. No cortisone injection indicated today   4. No Physical/Occupational Therapy indicated today  5. Yes diagnostic test indicated today: MRI R ELBOW  6.  Yes durable medical equipment indicated today LONG ARM SPLINT & SLING  7. No referral indicated today   8. No medications indicated today:   9. No Narcotic indicated today      RTC following MRI      Scribed by Aleksandar Hdez [address]) as dictated by Marva Ortega MD    I, Dr. Marva Ortega, confirm that all documentation is accurate.     Marva Otrega M.D.   [facility]

## 2022-08-30 ENCOUNTER — TELEPHONE (OUTPATIENT)
Dept: ORTHOPEDIC SURGERY | Age: 62
End: 2022-08-30

## 2022-08-30 ENCOUNTER — HOSPITAL ENCOUNTER (OUTPATIENT)
Dept: MRI IMAGING | Age: 62
Discharge: HOME | End: 2022-08-30
Attending: ORTHOPAEDIC SURGERY
Payer: COMMERCIAL

## 2022-08-30 DIAGNOSIS — S46.311A RUPTURE OF RIGHT TRICEPS TENDON, INITIAL ENCOUNTER: ICD-10-CM

## 2022-08-30 PROCEDURE — 73221 MRI JOINT UPR EXTREM W/O DYE: CPT

## 2022-08-30 NOTE — TELEPHONE ENCOUNTER
PT CAME FOR FOLLOW UP AFTER MRI PT WAS TOLD HE NEEDED AN APPT FOR 9/1/2022 NURSES SAID THEY WOULD GIVE HIM AN CALL AFTER LOOKING AT [facility] SCHEDULE

## 2022-09-01 ENCOUNTER — TELEPHONE (OUTPATIENT)
Dept: PHYSICAL THERAPY | Age: 62
End: 2022-09-01

## 2022-09-01 ENCOUNTER — OFFICE VISIT (OUTPATIENT)
Dept: ORTHOPEDIC SURGERY | Age: 62
End: 2022-09-01
Payer: COMMERCIAL

## 2022-09-01 VITALS — TEMPERATURE: 97.1 F | HEIGHT: 75 IN | WEIGHT: 281 LBS | BODY MASS INDEX: 34.94 KG/M2

## 2022-09-01 DIAGNOSIS — S50.01XD CONTUSION OF RIGHT ELBOW, SUBSEQUENT ENCOUNTER: Primary | ICD-10-CM

## 2022-09-01 PROCEDURE — 99214 OFFICE O/P EST MOD 30 MIN: CPT | Performed by: PHYSICIAN ASSISTANT

## 2022-09-01 RX ORDER — METHYLPREDNISOLONE 4 MG/1
TABLET ORAL
Qty: 1 DOSE PACK | Refills: 0 | Status: SHIPPED | OUTPATIENT
Start: 2022-09-01 | End: 2022-09-12

## 2022-09-01 RX ORDER — GABAPENTIN 300 MG/1
300 CAPSULE ORAL
Qty: 21 CAPSULE | Refills: 0 | Status: SHIPPED | OUTPATIENT
Start: 2022-09-01 | End: 2022-09-12

## 2022-09-01 NOTE — PROGRESS NOTES
Yoselyn Taylor  1960   Chief Complaint   Patient presents with    Results     Mri on  rt arm              HISTORY OF PRESENT ILLNESS  Yoselyn Taylor is a 59 y.o. male who presents today for evaluation of right elbow. He rates his pain 9/10 today. Pain has been present for around 2 weeks. He was snorkeling when he fell backwards while in the water and hit the elbow on a hard rock/surface. Notes swelling. Reports pain radiates down into his fingers. Patient denies any fever, chills, chest pain, shortness of breath or calf pain. The remainder of the review of systems is negative. There are no new illness or injuries to report since last seen in the office. No changes in medications, allergies, social or family history. PHYSICAL EXAM:   Visit Vitals  Temp 97.1 °F (36.2 °C) (Temporal)   Ht 6' 3\" (1.905 m)   Wt 281 lb (127.5 kg)   BMI 35.12 kg/m²       The patient is a well-developed, well-nourished male   in no acute distress. The patient is alert and oriented times three. The patient is alert and oriented times three. Mood and affect are normal.  LYMPHATIC: lymph nodes are not enlarged and are within normal limits  SKIN: normal in color and non tender to palpation. There are no bruises or abrasions noted. NEUROLOGICAL: Motor sensory exam is within normal limits. Reflexes are equal bilaterally. There is normal sensation to pinprick and light touch  MUSCULOSKELETAL:  Examination Right Elbow   Skin Intact   Range of Motion    Tenderness Medial Epicondyle -   Tenderness Lateral Epicondyle -   Tenderness Olecranon Bursa +   Swelling +   Bruising -   Stability Normal   Motor Strength  Normal   Neurovascular Intact         IMAGING: XR of the right elbow obtained at PCP Dr. Anoop Molina office brought in on disc by patient dated 8/28/2022 was reviewed and read by Dr. Ranjit London: Agueda Kesy over the olecranon, question of small avulsion    MRI of right elbow read and reviewed by myself reveal:   1.  Triceps tendon is intact with mild tendinosis. Extensive posterior  subcutaneous edema. Cellulitis? Superimposed bursitis or focal abscess not  excluded. No fracture. Olecranon spur with no erosion or marrow edema. 2. Joint effusion, synovitis, intra-articular loose body. 3. Partial-thickness tear of the ulnar collateral ligament with common flexor  tendinosis. IMPRESSION:      ICD-10-CM ICD-9-CM    1. Contusion of right elbow, subsequent encounter  S50. 01XD V58.89 REFERRAL TO PHYSICAL THERAPY     923.11 REFERRAL TO OCCUPATIONAL THERAPY      methylPREDNISolone (MEDROL DOSEPACK) 4 mg tablet      gabapentin (NEURONTIN) 300 mg capsule             PLAN:  1. Pt presents today with right elbow pain no evidence of triceps tendon tear or fracture. Will remove splint today. Will start range of motion. Will refer to either physical therapy or occupational therapy for assistance with the range of motion. Will place on a Medrol Dosepak. Gabapentin given for night discomfort. Risk factors include: BMI>30  2. No ultrasound exam indicated today  3. No cortisone injection indicated today   4. Yes  Physical/Occupational Therapy indicated today  5. No  diagnostic test indicated today:   6. No  durable medical equipment indicated today   7. No referral indicated today   8. Yes  medications indicated today: MDP and gabapentin  9.  No Narcotic indicated today      RTC 2 weeks    [facility]

## 2022-09-12 ENCOUNTER — OFFICE VISIT (OUTPATIENT)
Dept: CARDIOLOGY CLINIC | Age: 62
End: 2022-09-12
Payer: COMMERCIAL

## 2022-09-12 VITALS
SYSTOLIC BLOOD PRESSURE: 138 MMHG | DIASTOLIC BLOOD PRESSURE: 82 MMHG | HEART RATE: 78 BPM | HEIGHT: 75 IN | OXYGEN SATURATION: 97 % | BODY MASS INDEX: 34.57 KG/M2 | WEIGHT: 278 LBS

## 2022-09-12 DIAGNOSIS — Z98.890 STATUS POST CATHETER ABLATION OF ATRIAL FIBRILLATION: ICD-10-CM

## 2022-09-12 DIAGNOSIS — I10 ESSENTIAL HYPERTENSION: ICD-10-CM

## 2022-09-12 DIAGNOSIS — I48.0 PAROXYSMAL ATRIAL FIBRILLATION (HCC): Primary | ICD-10-CM

## 2022-09-12 DIAGNOSIS — G47.33 OBSTRUCTIVE SLEEP APNEA SYNDROME: ICD-10-CM

## 2022-09-12 DIAGNOSIS — E78.5 DYSLIPIDEMIA: ICD-10-CM

## 2022-09-12 DIAGNOSIS — E66.01 SEVERE OBESITY (HCC): ICD-10-CM

## 2022-09-12 PROCEDURE — 93000 ELECTROCARDIOGRAM COMPLETE: CPT | Performed by: INTERNAL MEDICINE

## 2022-09-12 PROCEDURE — 99214 OFFICE O/P EST MOD 30 MIN: CPT | Performed by: INTERNAL MEDICINE

## 2022-09-12 NOTE — PROGRESS NOTES
Rizwan Vasquez presents today for   Chief Complaint   Patient presents with    Follow-up     6 month    Dizziness       Rizwan Vasquez preferred language for health care discussion is english/other. Is someone accompanying this pt? no    Is the patient using any DME equipment during [address]? no    Depression Screening:  3 most recent PHQ Screens 9/12/2022   Little interest or pleasure in doing things Not at all   Feeling down, depressed, irritable, or hopeless Not at all   Total Score PHQ 2 0       Learning Assessment:  Learning Assessment 9/12/2022   PRIMARY LEARNER Patient   PRIMARY LANGUAGE ENGLISH   LEARNER PREFERENCE PRIMARY DEMONSTRATION   ANSWERED BY patient   RELATIONSHIP SELF       Abuse Screening:  Abuse Screening Questionnaire 9/12/2022   Do you ever feel afraid of your partner? N   Are you in a relationship with someone who physically or mentally threatens you? N   Is it safe for you to go home? Y       Fall Risk  Fall Risk Assessment, last 12 mths 3/14/2022   Able to walk? Yes   Fall in past 12 months? 0   Do you feel unsteady? 0   Are you worried about falling 0           Pt currently taking Anticoagulant therapy? Xarelto 20 mg daily    Pt currently taking Antiplatelet therapy ? no      Coordination of Care:  1. Have you been to the ER, urgent care clinic since your last visit? Hospitalized since your last visit? no    2. Have you seen or consulted any other health care providers outside of the [address] [place] [address] since your last visit? Include any pap smears or colon screening.  no

## 2022-09-12 NOTE — PROGRESS NOTES
HISTORY OF PRESENT ILLNESS  Sayra Newberry is a 59 y.o. male. Follow-up  Pertinent negatives include no chest pain, no abdominal pain, no headaches and no shortness of breath. Dizziness  Pertinent negatives include no chest pain, no abdominal pain, no headaches and no shortness of breath. Patient presents for a follow-up office visit. Patient has a past medical history significant for paroxysmal atrial fibrillation, status post atrial fibrillation ablation in 2007. The patient also has a history of obstructive sleep apnea on nightly CPAP, dyslipidemia and hypothyroidism. Patient was found to be back in atrial fibrillation in July 2020 suspected for least 6 to 9 months given his symptom onset. He has been very symptomatic to his atrial fibrillation in the past.  He was started on Xarelto for oral anticoagulation and underwent an elective cardioversion in September 2020, which was initially successful, however he converted back to atrial fibrillation within a few weeks and was feeling again short of breath fatigued and dizzy with decreased activity tolerance. He ultimately underwent a pulmonary vein isolation atrial for ablation procedure with Dr. Dougie Barber at the end of October 2020. Approximately a week and a half later, he is found to be back in atrial fibrillation during a PCP follow-up visit. He underwent a follow-up echocardiogram earlier this month in November 2020 which showed preserved LV function, EF 55 to 60%, mild concentric LVH, mild right atrial and right ventricular shunt with normal PA pressures. Patient was hospitalized for elective dofetilide loading for 3 days at the beginning of December 2020. He did not have any proarrhythmic effects. He did convert back to sinus rhythm after 48 hours of the loading. He then wore a 30-day event monitor in January 2021 which showed normal sinus rhythm for the most part.   He was in atrial fibrillation 8% of the time the majority of the time his atrial fibrillation rates were well controlled. He was last seen in our office 6 months ago. Since last visit, he has not noted any prolonged heart palpitations. He does get occasional dizzy spells when he bends over and stands up too quickly. He injured his right elbow fishing for lobster in [place] earlier this summer and is still recovering from the injury. He states as result he has not been as active and has gained almost 20 pounds in weight. Past Medical History:   Diagnosis Date    Crohn disease ([facility] [address]     Dyslipidemia     History of cardiovascular stress test 09/2015    Normal stress echocardiogram    History of dizziness     Likely from autonomic insufficiency. Occasional brief loss of vision (10-15 secs). History of echocardiogram 11/07/2012    EF 55-60%. No WMA. Normal RVSP/PASP. Paroxysmal atrial fibrillation ([facility] [address] 2007    s/p afib ablation    S/P ablation of atrial fibrillation 2007    Sleep apnea 2005-6    CPAP at night    Thyroid disease       Current Outpatient Medications   Medication Sig Dispense Refill    telmisartan (MICARDIS) 40 mg tablet TAKE 1 TABLET DAILY 90 Tablet 3    dofetilide (TIKOSYN) 500 mcg capsule Take 1 Capsule by mouth two (2) times a day. 180 Capsule 3    Xarelto 20 mg tab tablet TAKE 1 TABLET DAILY 90 Tablet 3    synthroid 50 mcg tablet Take 50 mcg by mouth Daily (before breakfast). fenofibrate nanocrystallized (TRICOR) 145 mg tablet Take 145 mg by mouth daily. krill oil 500 mg cap Take 1 Capsule by mouth daily. multivitamin (ONE A DAY) tablet Take 1 Tab by mouth daily. naproxen sodium (Aleve) 220 mg tablet Take 220 mg by mouth as needed. Allergies   Allergen Reactions    Cardizem [Diltiazem Hcl] Other (comments)     Headache. Patient states he is not allergic to this medication.        Social History     Tobacco Use    Smoking status: Never    Smokeless tobacco: Never   Vaping Use    Vaping Use: Never used   Substance Use Topics    Alcohol use: No    Drug use: Never     History reviewed. No pertinent family history. Review of Systems   Constitutional:  Negative for chills, fever and weight loss. HENT:  Negative for nosebleeds. Eyes:  Negative for blurred vision and double vision. Respiratory:  Negative for cough, shortness of breath and wheezing. Cardiovascular:  Negative for chest pain, palpitations, orthopnea, claudication, leg swelling and PND. Gastrointestinal:  Negative for abdominal pain, heartburn, nausea and vomiting. Genitourinary:  Negative for dysuria and hematuria. Musculoskeletal:  Negative for falls and myalgias. Skin:  Negative for rash. Neurological:  Negative for dizziness, focal weakness and headaches. Endo/Heme/Allergies:  Does not bruise/bleed easily. Psychiatric/Behavioral:  Negative for substance abuse. Visit Vitals  /82 (BP 1 Location: Left upper arm, BP Patient Position: Sitting, BP Cuff Size: Large adult)   Pulse 78   Ht 6' 3\" (1.905 m)   Wt 126.1 kg (278 lb)   SpO2 97%   BMI 34.75 kg/m²      Physical Exam  Constitutional:       Appearance: He is well-developed. HENT:      Head: Normocephalic and atraumatic. Eyes:      Conjunctiva/sclera: Conjunctivae normal.   Neck:      Vascular: No carotid bruit or JVD. Cardiovascular:      Rate and Rhythm: Normal rate and regular rhythm. Pulses: Normal pulses. Heart sounds: S1 normal and S2 normal. Heart sounds are distant. No murmur heard. No gallop. No S3 sounds. Pulmonary:      Breath sounds: Normal breath sounds. No wheezing or rales. Abdominal:      General: Bowel sounds are normal.      Palpations: Abdomen is soft. Tenderness: There is no abdominal tenderness. Musculoskeletal:         General: No swelling, tenderness or deformity. Cervical back: Neck supple. Skin:     General: Skin is warm and dry. Neurological:      Mental Status: He is alert and oriented to person, place, and time. Psychiatric:         Behavior: Behavior normal.         Thought Content: Thought content normal.     EKG:  Normal sinus rhythm, first-degree AV block, normal QTc interval, nonspecific T wave abnormality. Compared to the previous EKG, no significant change. ASSESSMENT and PLAN    Paroxysmal atrial fibrillation. Status post initial atrial fibrillation ablation in 2007. Patient was back in atrial fibrillation again in July 2020 suspected for at least six or more months. He poorly tolerates his arrhythmia due to fatigue and decreased activity tolerance. Attempted cardioversion in September 2020 maintain sinus rhythm for less than 2 weeks. He then underwent a repeat ablation at the end of October 2020 with a pulmonary vein isolation procedure using a cryocatheter. Patient was back in atrial fibrillation 10 days later with similar symptoms. Patient then underwent elective Tikosyn loading in the hospital to begin of December 2020 and did convert back to sinus rhythm. Patient completed a 30-day event monitor in January 2021 to evaluate a near syncopal episode. He was asymptomatic during the 30 days. He was in sinus rhythm 92% of the time. Atrial fibrillation 8% of the time with average heart rate during atrial fibrillation episodes at 89 bpm.  Patient denies any significant heart palpitations over the past 6 months. I would continue his current regimen of dofetilide and Xarelto. Essential hypertension. This now appears to be well controlled on telmisartan as monotherapy. He can continue this regimen. Dyslipidemia. Patient primarily has elevated triglycerides and he has been managed with fenofibrate and krill oil by his PCP. Obstructive sleep apnea. Patient remains compliant with his nightly CPAP. Obesity. Patient has gained 18 pounds in weight since last visit.   This is the same weight he had previously lost.  He was encouraged to try and get his weight is close to 250 pounds as possible. Follow-up in 6 months, sooner if needed.

## 2022-09-15 ENCOUNTER — HOSPITAL ENCOUNTER (EMERGENCY)
Age: 62
Discharge: HOME | End: 2022-09-15
Attending: STUDENT IN AN ORGANIZED HEALTH CARE EDUCATION/TRAINING PROGRAM
Payer: COMMERCIAL

## 2022-09-15 ENCOUNTER — APPOINTMENT (OUTPATIENT)
Dept: CT IMAGING | Age: 62
End: 2022-09-15
Attending: EMERGENCY MEDICINE
Payer: COMMERCIAL

## 2022-09-15 ENCOUNTER — OFFICE VISIT (OUTPATIENT)
Dept: ORTHOPEDIC SURGERY | Age: 62
End: 2022-09-15
Payer: COMMERCIAL

## 2022-09-15 VITALS
WEIGHT: 274 LBS | HEART RATE: 89 BPM | BODY MASS INDEX: 34.07 KG/M2 | DIASTOLIC BLOOD PRESSURE: 80 MMHG | OXYGEN SATURATION: 96 % | RESPIRATION RATE: 16 BRPM | HEIGHT: 75 IN | SYSTOLIC BLOOD PRESSURE: 135 MMHG | TEMPERATURE: 98.3 F

## 2022-09-15 VITALS — TEMPERATURE: 97.8 F | BODY MASS INDEX: 34.07 KG/M2 | WEIGHT: 274 LBS | HEIGHT: 75 IN

## 2022-09-15 DIAGNOSIS — M70.21 OLECRANON BURSITIS, RIGHT ELBOW: Primary | ICD-10-CM

## 2022-09-15 DIAGNOSIS — R42 DIZZINESS: Primary | ICD-10-CM

## 2022-09-15 DIAGNOSIS — M54.50 ACUTE MIDLINE LOW BACK PAIN WITHOUT SCIATICA: ICD-10-CM

## 2022-09-15 DIAGNOSIS — M54.50 ACUTE MIDLINE LOW BACK PAIN, UNSPECIFIED WHETHER SCIATICA PRESENT: ICD-10-CM

## 2022-09-15 LAB
ALBUMIN SERPL-MCNC: 3.8 G/DL (ref 3.4–5)
ALBUMIN/GLOB SERPL: 1.2 {RATIO} (ref 0.8–1.7)
ALP SERPL-CCNC: 58 U/L (ref 45–117)
ALT SERPL-CCNC: 31 U/L (ref 16–61)
ANION GAP SERPL CALC-SCNC: 3 MMOL/L (ref 3–18)
APPEARANCE UR: CLEAR
AST SERPL-CCNC: 22 U/L (ref 10–38)
BASOPHILS # BLD: 0.1 K/UL (ref 0–0.1)
BASOPHILS NFR BLD: 1 % (ref 0–2)
BILIRUB SERPL-MCNC: 0.6 MG/DL (ref 0.2–1)
BILIRUB UR QL: NEGATIVE
BUN SERPL-MCNC: 28 MG/DL (ref 7–18)
BUN/CREAT SERPL: 28 (ref 12–20)
CALCIUM SERPL-MCNC: 9.3 MG/DL (ref 8.5–10.1)
CHLORIDE SERPL-SCNC: 108 MMOL/L (ref 100–111)
CO2 SERPL-SCNC: 31 MMOL/L (ref 21–32)
COLOR UR: YELLOW
CREAT SERPL-MCNC: 0.99 MG/DL (ref 0.6–1.3)
DIFFERENTIAL METHOD BLD: ABNORMAL
EOSINOPHIL # BLD: 0.1 K/UL (ref 0–0.4)
EOSINOPHIL NFR BLD: 1 % (ref 0–5)
EPITH CASTS URNS QL MICRO: ABNORMAL /LPF (ref 0–5)
ERYTHROCYTE [DISTWIDTH] IN BLOOD BY AUTOMATED COUNT: 13.1 % (ref 11.6–14.5)
GLOBULIN SER CALC-MCNC: 3.1 G/DL (ref 2–4)
GLUCOSE SERPL-MCNC: 101 MG/DL (ref 74–99)
GLUCOSE UR STRIP.AUTO-MCNC: NEGATIVE MG/DL
GRAN CASTS URNS QL MICRO: ABNORMAL /LPF
HCT VFR BLD AUTO: 42.1 % (ref 36–48)
HGB BLD-MCNC: 13.8 G/DL (ref 13–16)
HGB UR QL STRIP: NEGATIVE
IMM GRANULOCYTES # BLD AUTO: 0 K/UL
IMM GRANULOCYTES NFR BLD AUTO: 0 %
KETONES UR QL STRIP.AUTO: NEGATIVE MG/DL
LEUKOCYTE ESTERASE UR QL STRIP.AUTO: NEGATIVE
LYMPHOCYTES # BLD: 1.2 K/UL (ref 0.9–3.6)
LYMPHOCYTES NFR BLD: 16 % (ref 21–52)
MCH RBC QN AUTO: 29.7 PG (ref 24–34)
MCHC RBC AUTO-ENTMCNC: 32.8 G/DL (ref 31–37)
MCV RBC AUTO: 90.5 FL (ref 78–100)
MONOCYTES # BLD: 0.1 K/UL (ref 0.05–1.2)
MONOCYTES NFR BLD: 2 % (ref 3–10)
MUCOUS THREADS URNS QL MICRO: ABNORMAL /LPF
NEUTS BAND NFR BLD MANUAL: 7 % (ref 0–5)
NEUTS SEG # BLD: 5.8 K/UL (ref 1.8–8)
NEUTS SEG NFR BLD: 73 % (ref 40–73)
NITRITE UR QL STRIP.AUTO: NEGATIVE
NRBC # BLD: 0 K/UL (ref 0–0.01)
NRBC BLD-RTO: 0 PER 100 WBC
PH UR STRIP: 6.5 [PH] (ref 5–8)
PLATELET # BLD AUTO: 253 K/UL (ref 135–420)
PLATELET COMMENTS,PCOM: ABNORMAL
PMV BLD AUTO: 9.3 FL (ref 9.2–11.8)
POTASSIUM SERPL-SCNC: 4.4 MMOL/L (ref 3.5–5.5)
PROT SERPL-MCNC: 6.9 G/DL (ref 6.4–8.2)
PROT UR STRIP-MCNC: 100 MG/DL
RBC # BLD AUTO: 4.65 M/UL (ref 4.35–5.65)
RBC #/AREA URNS HPF: ABNORMAL /HPF (ref 0–5)
RBC MORPH BLD: ABNORMAL
SODIUM SERPL-SCNC: 142 MMOL/L (ref 136–145)
SP GR UR REFRACTOMETRY: 1.02 (ref 1–1.03)
TROPONIN-HIGH SENSITIVITY: 16 NG/L (ref 0–78)
TROPONIN-HIGH SENSITIVITY: 16 NG/L (ref 0–78)
UROBILINOGEN UR QL STRIP.AUTO: 1 EU/DL (ref 0.2–1)
WBC # BLD AUTO: 7.3 K/UL (ref 4.6–13.2)
WBC URNS QL MICRO: ABNORMAL /HPF (ref 0–4)

## 2022-09-15 PROCEDURE — 81001 URINALYSIS AUTO W/SCOPE: CPT

## 2022-09-15 PROCEDURE — 93005 ELECTROCARDIOGRAM TRACING: CPT

## 2022-09-15 PROCEDURE — 84484 ASSAY OF TROPONIN QUANT: CPT

## 2022-09-15 PROCEDURE — 99285 EMERGENCY DEPT VISIT HI MDM: CPT

## 2022-09-15 PROCEDURE — 80053 COMPREHEN METABOLIC PANEL: CPT

## 2022-09-15 PROCEDURE — 85025 COMPLETE CBC W/AUTO DIFF WBC: CPT

## 2022-09-15 PROCEDURE — 70450 CT HEAD/BRAIN W/O DYE: CPT

## 2022-09-15 PROCEDURE — 74011000636 HC RX REV CODE- 636: Performed by: STUDENT IN AN ORGANIZED HEALTH CARE EDUCATION/TRAINING PROGRAM

## 2022-09-15 PROCEDURE — 71275 CT ANGIOGRAPHY CHEST: CPT

## 2022-09-15 PROCEDURE — 20606 DRAIN/INJ JOINT/BURSA W/US: CPT | Performed by: PHYSICIAN ASSISTANT

## 2022-09-15 RX ORDER — TRIAMCINOLONE ACETONIDE 40 MG/ML
40 INJECTION, SUSPENSION INTRA-ARTICULAR; INTRAMUSCULAR ONCE
Status: COMPLETED | OUTPATIENT
Start: 2022-09-15 | End: 2022-09-15

## 2022-09-15 RX ADMIN — TRIAMCINOLONE ACETONIDE 40 MG: 40 INJECTION, SUSPENSION INTRA-ARTICULAR; INTRAMUSCULAR at 10:32

## 2022-09-15 RX ADMIN — IOPAMIDOL 100 ML: 755 INJECTION, SOLUTION INTRAVENOUS at 12:37

## 2022-09-15 NOTE — PROGRESS NOTES
Mervin Paz  1960   Chief Complaint   Patient presents with    Elbow Pain     rt              HISTORY OF PRESENT ILLNESS  Mervin Paz is a 59 y.o. male who presents today for evaluation of right elbow. He rates his pain 9/10 today. Pain has been present for around 2 weeks. He was snorkeling when he fell backwards while in the water and hit the elbow on a hard rock/surface. Notes swelling. Reports pain radiates down into his fingers. Patient denies any fever, chills, chest pain, shortness of breath or calf pain. The remainder of the review of systems is negative. There are no new illness or injuries to report since last seen in the office. No changes in medications, allergies, social or family history. PHYSICAL EXAM:   Visit Vitals  Temp 97.8 °F (36.6 °C) (Temporal)   Ht 6' 3\" (1.905 m)   Wt 274 lb (124.3 kg)   BMI 34.25 kg/m²         The patient is a well-developed, well-nourished male   in no acute distress. The patient is alert and oriented times three. The patient is alert and oriented times three. Mood and affect are normal.  LYMPHATIC: lymph nodes are not enlarged and are within normal limits  SKIN: normal in color and non tender to palpation. There are no bruises or abrasions noted. NEUROLOGICAL: Motor sensory exam is within normal limits. Reflexes are equal bilaterally. There is normal sensation to pinprick and light touch  MUSCULOSKELETAL:  Examination Right Elbow   Skin Intact   Range of Motion 0-110   Tenderness Medial Epicondyle -   Tenderness Lateral Epicondyle -   Tenderness Olecranon Bursa +   Swelling + olecranon bursa   Bruising -   Stability Normal   Motor Strength  Normal   Neurovascular Intact         IMAGING: XR of the right elbow obtained at PCP Dr. Nova Brothers office brought in on disc by patient dated 8/28/2022 was reviewed and read by Dr. Gabe Capellan: Carlos Rao over the olecranon, question of small avulsion    MRI of right elbow read and reviewed by myself reveal:   1.  Triceps tendon is intact with mild tendinosis. Extensive posterior  subcutaneous edema. Cellulitis? Superimposed bursitis or focal abscess not  excluded. No fracture. Olecranon spur with no erosion or marrow edema. 2. Joint effusion, synovitis, intra-articular loose body. 3. Partial-thickness tear of the ulnar collateral ligament with common flexor  tendinosis. PROCEDURE:   Right elbow Aspiration and Injection with Ultrasound Guidance    Indication:Right Elbow pain/swelling      After sterile prep, right elbow was aspirated. 3 cc of bloody fluid were obtained. The fluid was discarded. After sterile prep, 1 cc of Xylocaine and 1 cc of Kenalog were injected into the right Elbow. Intra-bursal Ultrasound images captured using [address] Ultrasound machine using a frequency of 10 MHz with a linear transducer and scanned into patient's chart. [facility] [place]  OFFICE PROCEDURE PROGRESS NOTE        Chart reviewed for the following:  Constantino HERNÁNDEZ PA, have reviewed the History, Physical and updated the Allergic reactions for Nonda Bound     TIME OUT performed immediately prior to start of procedure:  Constantino HERNÁNDEZ PA-C, have performed the following reviews on Nonda Bound prior to the start of the procedure:            * Patient was identified by name and date of birth   * Agreement on procedure being performed was verified  * Risks and Benefits explained to the patient  * Procedure site verified and marked as necessary  * Patient was positioned for comfort  * Consent was signed and verified     Time: 10:43 AM    Date of procedure: 9/15/2022    Procedure performed by:  CRISTINA Saunders    Provider assisted by: (see medication administration)    How tolerated by patient: tolerated the procedure well with no complications    Comments: none    IMPRESSION:      ICD-10-CM ICD-9-CM    1.  Olecranon bursitis, right elbow  M70.21 726.33 triamcinolone acetonide (KENALOG-40) 40 mg/mL injection 40 mg      ARTHROCENTESIS ASPIR&/INJ INTERM JT/BURS W/US      2. Acute midline low back pain without sciatica  M54.50 724.2                PLAN:  1. Pt presents today with right elbow pain olecranon bursitis. Discussed treatment options with the patient. Given his pain with motion and any pressure we will aspirate today and injection of cortisone was given. Patient tolerated the injection well. As he was walking out of the office he began to feel slightly dizzy. He then sat down and had sudden onset of extreme lower back pain. He was unable to walk. It was very painful for him to sit. Vitals were taken and his blood pressure was noted to be 225/210 with a pulse of 83.  911 was called and patient was taken to the emergency room for further evaluation of acute low back pain. Risk factors include: BMI>30  2. No ultrasound exam indicated today  3. Yes  cortisone injection indicated today   4. No   Physical/Occupational Therapy indicated today  5. No  diagnostic test indicated today:   6. No  durable medical equipment indicated today   7. No referral indicated today   8. No   medications indicated today: none  9.  No Narcotic indicated today      RTC PRN    Barb Penaloza PA-C  [address] [facility]

## 2022-09-15 NOTE — ED TRIAGE NOTES
Patient arrives to ER via EMS from [address] [place] [address] after receiving steroid injection to right elbow. Per EMS, patient received steroid injection to right elbow, then became dizzy and had acute onset of severe low back pain. EMS reports that office stated patient BP at 213/205 with pulse of 77 prior to EMS arrival. Patient states that he was gripping chair very hard during that BP check. EMS states that patient's BP was 149/86 with pulse of 88 on their arrival to scene. Patient c/o lower back pain rated at 2/10 on arrival to triage.

## 2022-09-15 NOTE — ED NOTES
Pt is a/o x 4 and presents after visit to orthopedic dr where he was receiving a steroid injection for inflamed elbow following a fall a few weeks ago. He states when he went to stand up, he felt dizzy but was able to walk to the lobby to sit down bc the dizziness persisted. He then began to feel intense pain in lower back, that radiated down left leg. He reports he has never felt pain like that before. Pain has subsided at this time. Pt reports dizziness faintly remains but \"it's not like it was before\". Easy work of breathing noted. Lungs clear. No coughing. Pt heart rate noted to be slightly irregular, pt verbalizes a history of a-fib. Pt gait steady as he ambulates to restroom at this time. Wife at bedside. Fall precautions in place.

## 2022-09-15 NOTE — ED PROVIDER NOTES
EMERGENCY DEPARTMENT HISTORY AND PHYSICAL EXAM    Date: 9/15/2022  Patient Name: Marium Navas    History of Presenting Illness     Chief Complaint   Patient presents with    LOW BACK PAIN         History Provided By: Patient    Additional History (Context): Marium Navas is a 59 y.o. male with hypertension, hyperlipidemia, and A. fib on Xarelto, Crohn's disease, thyroid disease, sleep apnea  who presents with complaint of dizziness today. He was at his orthopedic surgeon's office for follow-up of his right elbow which is being treated for bursitis. He had a fall a couple of weeks ago while he was fishing in [place] and fell backwards striking his right elbow which became infected. Today was one of the final sessions for simply fluid removal and a steroid injection for persistent bursitis. After he finished his Ortho appointment he was walking out of the office and felt incredibly dizzy. This was preceded by pain in his low back. He said the pain radiated up into his upper back and both legs all at the same time. He is currently not symptomatic. He denies any new medications. Denies nausea vomiting diarrhea shortness of breath or chest pain. Came via EMS. PCP: Anastasia Birch MD    Current Outpatient Medications   Medication Sig Dispense Refill    telmisartan (MICARDIS) 40 mg tablet TAKE 1 TABLET DAILY 90 Tablet 3    dofetilide (TIKOSYN) 500 mcg capsule Take 1 Capsule by mouth two (2) times a day. 180 Capsule 3    Xarelto 20 mg tab tablet TAKE 1 TABLET DAILY 90 Tablet 3    synthroid 50 mcg tablet Take 50 mcg by mouth Daily (before breakfast). fenofibrate nanocrystallized (TRICOR) 145 mg tablet Take 145 mg by mouth daily. krill oil 500 mg cap Take 1 Capsule by mouth daily. multivitamin (ONE A DAY) tablet Take 1 Tab by mouth daily. naproxen sodium (Aleve) 220 mg tablet Take 220 mg by mouth as needed.          Past History     Past Medical History:  Past Medical History:   Diagnosis Date Crohn disease ([facility] [address]     Dyslipidemia     History of cardiovascular stress test 09/2015    Normal stress echocardiogram    History of dizziness     Likely from autonomic insufficiency. Occasional brief loss of vision (10-15 secs). History of echocardiogram 11/07/2012    EF 55-60%. No WMA. Normal RVSP/PASP. Paroxysmal atrial fibrillation ([facility] [address] 2007    s/p afib ablation    S/P ablation of atrial fibrillation 2007    Sleep apnea 2005-6    CPAP at night    Thyroid disease        Past Surgical History:  Past Surgical History:   Procedure Laterality Date    HX AFIB ABLATION      HX HERNIA REPAIR      X2    HX OTHER SURGICAL      arms    HX OTHER SURGICAL      Toe on left foot reattached after accident. HX TONSILLECTOMY      SC CARDIOVERSION ELECTIVE ARRHYTHMIA EXTERNAL N/A 9/17/2020    EP CARDIOVERSION performed by Yinka Perla MD at [address] [place] [address] CATH LAB    SC COMPRE ELECTROPHYSIOL XM W/LEFT ATRIAL PACNG/REC N/A 10/27/2020    Lt Atrial Pace & Record During Ep Study performed by Dorrine Dakins, MD at [address] [place] [address] CATH LAB    SC COMPRE EP EVAL ABLTJ ATR FIB PULM VEIN ISOLATION N/A 10/27/2020    ABLATION A-FIB  W COMPLETE EP STUDY performed by Dorrine Dakins, MD at [address] [place] [address] CATH LAB       Family History:  History reviewed. No pertinent family history. Social History:  Social History     Tobacco Use    Smoking status: Never    Smokeless tobacco: Never   Vaping Use    Vaping Use: Never used   Substance Use Topics    Alcohol use: No    Drug use: Never       Allergies: Allergies   Allergen Reactions    Cardizem [Diltiazem Hcl] Other (comments)     Headache. Patient states he is not allergic to this medication. Review of Systems   Review of Systems   Constitutional:  Negative for fever. HENT: Negative. Eyes:  Negative for visual disturbance. Respiratory:  Negative for shortness of breath. Cardiovascular:  Negative for chest pain.    Gastrointestinal:  Negative for abdominal pain, diarrhea, nausea and vomiting. Endocrine: Negative. Genitourinary: Negative. Musculoskeletal:  Positive for back pain. Skin:  Negative for rash. Allergic/Immunologic: Negative. Neurological:  Positive for dizziness and light-headedness. Negative for syncope, weakness and numbness. Hematological:  Bruises/bleeds easily. Psychiatric/Behavioral: Negative. All Other Systems Negative  Physical Exam     Vitals:    09/15/22 1000 09/15/22 1127 09/15/22 1129 09/15/22 1130   BP: (!) 145/88 122/76 128/70 135/80   Pulse: 82 85 79 89   Resp: 16      Temp: 98.3 °F (36.8 °C)      SpO2: 96%      Weight: 124.3 kg (274 lb)      Height: 6' 3\" (1.905 m)        Physical Exam  Vitals and nursing note reviewed. Constitutional:       General: He is not in acute distress. Appearance: Normal appearance. He is well-developed and normal weight. He is not ill-appearing, toxic-appearing or diaphoretic. HENT:      Head: Normocephalic and atraumatic. Neck:      Thyroid: No thyromegaly. Vascular: No carotid bruit. Trachea: No tracheal deviation. Cardiovascular:      Rate and Rhythm: Normal rate and regular rhythm. Pulses: Normal pulses. Heart sounds: Normal heart sounds. No murmur heard. No friction rub. No gallop. Comments: Equal radial pulses. Strong DP PT pulses palpable bilaterally. Pulmonary:      Effort: Pulmonary effort is normal. No respiratory distress. Breath sounds: Normal breath sounds. No stridor. No wheezing or rales. Chest:      Chest wall: No tenderness. Abdominal:      General: There is no distension. Palpations: Abdomen is soft. There is no mass. Tenderness: There is no abdominal tenderness. There is no guarding or rebound. Comments: No pulsatile mass palpated. Musculoskeletal:         General: Normal range of motion. Cervical back: Normal range of motion and neck supple. Skin:     General: Skin is warm and dry.       Coloration: Skin is not pale. Neurological:      Mental Status: He is alert and oriented to person, place, and time. Psychiatric:         Speech: Speech normal.         Behavior: Behavior normal.         Thought Content: Thought content normal.         Judgment: Judgment normal.          Diagnostic Study Results     Labs -     Recent Results (from the past 12 hour(s))   URINALYSIS W/ RFLX MICROSCOPIC    Collection Time: 09/15/22 10:34 AM   Result Value Ref Range    Color YELLOW      Appearance CLEAR      Specific gravity 1.021 1.005 - 1.030      pH (UA) 6.5 5.0 - 8.0      Protein 100 (A) NEG mg/dL    Glucose Negative NEG mg/dL    Ketone Negative NEG mg/dL    Bilirubin Negative NEG      Blood Negative NEG      Urobilinogen 1.0 0.2 - 1.0 EU/dL    Nitrites Negative NEG      Leukocyte Esterase Negative NEG     CBC WITH AUTOMATED DIFF    Collection Time: 09/15/22 10:34 AM   Result Value Ref Range    WBC 7.3 4.6 - 13.2 K/uL    RBC 4.65 4.35 - 5.65 M/uL    HGB 13.8 13.0 - 16.0 g/dL    HCT 42.1 36.0 - 48.0 %    MCV 90.5 78.0 - 100.0 FL    MCH 29.7 24.0 - 34.0 PG    MCHC 32.8 31.0 - 37.0 g/dL    RDW 13.1 11.6 - 14.5 %    PLATELET 855 068 - 483 K/uL    MPV 9.3 9.2 - 11.8 FL    NRBC 0.0 0  WBC    ABSOLUTE NRBC 0.00 0.00 - 0.01 K/uL    NEUTROPHILS 73 40 - 73 %    BAND NEUTROPHILS 7 (H) 0 - 5 %    LYMPHOCYTES 16 (L) 21 - 52 %    MONOCYTES 2 (L) 3 - 10 %    EOSINOPHILS 1 0 - 5 %    BASOPHILS 1 0 - 2 %    IMMATURE GRANULOCYTES 0 %    ABS. NEUTROPHILS 5.8 1.8 - 8.0 K/UL    ABS. LYMPHOCYTES 1.2 0.9 - 3.6 K/UL    ABS. MONOCYTES 0.1 0.05 - 1.2 K/UL    ABS. EOSINOPHILS 0.1 0.0 - 0.4 K/UL    ABS. BASOPHILS 0.1 0.0 - 0.1 K/UL    ABS. IMM.  GRANS. 0.0 K/UL    DF MANUAL      PLATELET COMMENTS ADEQUATE PLATELETS      RBC COMMENTS NORMOCYTIC, NORMOCHROMIC     METABOLIC PANEL, COMPREHENSIVE    Collection Time: 09/15/22 10:34 AM   Result Value Ref Range    Sodium 142 136 - 145 mmol/L    Potassium 4.4 3.5 - 5.5 mmol/L    Chloride 108 100 - 111 mmol/L CO2 31 21 - 32 mmol/L    Anion gap 3 3.0 - 18 mmol/L    Glucose 101 (H) 74 - 99 mg/dL    BUN 28 (H) 7.0 - 18 MG/DL    Creatinine 0.99 0.6 - 1.3 MG/DL    BUN/Creatinine ratio 28 (H) 12 - 20      GFR est AA >60 >60 ml/min/1.73m2    GFR est non-AA >60 >60 ml/min/1.73m2    Calcium 9.3 8.5 - 10.1 MG/DL    Bilirubin, total 0.6 0.2 - 1.0 MG/DL    ALT (SGPT) 31 16 - 61 U/L    AST (SGOT) 22 10 - 38 U/L    Alk. phosphatase 58 45 - 117 U/L    Protein, total 6.9 6.4 - 8.2 g/dL    Albumin 3.8 3.4 - 5.0 g/dL    Globulin 3.1 2.0 - 4.0 g/dL    A-G Ratio 1.2 0.8 - 1.7     TROPONIN-HIGH SENSITIVITY    Collection Time: 09/15/22 10:34 AM   Result Value Ref Range    Troponin-High Sensitivity 16 0 - 78 ng/L   URINE MICROSCOPIC ONLY    Collection Time: 09/15/22 10:34 AM   Result Value Ref Range    WBC 0 to 3 0 - 4 /hpf    RBC NONE 0 - 5 /hpf    Epithelial cells FEW 0 - 5 /lpf    Mucus FEW (A) NEG /lpf    Granular cast 0 to 3 NEG /lpf   TROPONIN-HIGH SENSITIVITY    Collection Time: 09/15/22  2:00 PM   Result Value Ref Range    Troponin-High Sensitivity 16 0 - 78 ng/L       Radiologic Studies -   CTA CHEST ABD PELV W WO CONT   Final Result      No acute or active appearing abnormality is identified in the chest abdomen or   pelvis. The vasculature is unremarkable. All CT scans at this facility are performed using dose optimization technique as   appropriate to the performed exam, to include automated exposure control,   adjustment of the mA and/or kV according to patient's size (Including   appropriate matching for site-specific examinations), or use of iterative   reconstruction technique. CT HEAD WO CONT   Final Result         No acute intracranial process.           All CT scans at this facility are performed using dose optimization technique as   appropriate to the performed exam, to include automated exposure control,   adjustment of the mA and/or kV according to patient's size (Including   appropriate matching for site-specific examinations), or use of iterative   reconstruction technique. CT Results  (Last 48 hours)                 09/15/22 1236  CTA CHEST ABD PELV W WO CONT Final result    Impression:      No acute or active appearing abnormality is identified in the chest abdomen or   pelvis. The vasculature is unremarkable. All CT scans at this facility are performed using dose optimization technique as   appropriate to the performed exam, to include automated exposure control,   adjustment of the mA and/or kV according to patient's size (Including   appropriate matching for site-specific examinations), or use of iterative   reconstruction technique. Narrative:  CTA CHEST,  ABDOMEN AND PELVIS WITHOUT AND WITH CONTRAST        COMPARISON: August 2018       INDICATIONS: Chest pain and hypertension       TECHNIQUE:        Noncontrast CT was done initially through the chest, abdomen, and pelvis. Contrast enhanced CT was then performed following the uneventful administration    of 100 cc of Isovue-370. Scanning was done with a multislice scanner. Volumetric data acquisition was   performed through the chest, abdomen, and pelvis. Reconstructions were created   in the axial, coronal, and sagittal planes. The data was also loaded on an   independent imaging workstation. Postprocessing was performed with indication   wheezing creation of MIPS as well as 3D shaded surface virtual reality   angiographic images without and with bone removal.        CT CHEST FINDINGS:       The lungs show no acute changes. A small nodular opacity in the middle lobe is   stable from 2018. Jun Glow There is no mediastinal adenopathy or mass. .   Cardiovascular structures seem unremarkable. .       CT ABDOMEN FINDINGS:       Liver: Hepatic steatosis. Right lobe cyst unchanged. Otherwise unremarkable   Spleen: Normal.   Left Kidney: Normal.   Right Kidney: Normal.   Pancreas: Normal.   Adrenal Glands: Negative.    Stomach, Small Bowel And Colon: Previous right colectomy. Otherwise negative. Lymph Nodes: No adenopathy. There is a fat-containing right inguinal hernia and there is distortion of the   right side of the urinary bladder suggesting the bladder is being drawn into the   hernia as a bladder ear. Peritoneal Spaces: No free fluid or air. The bladder is incompletely distended. A bladder ear is developing on the right   toward the right inguinal hernia. No other abnormalities are identified. Vascular:       Ascending Aorta: Normal in caliber were smooth intimal contours. .       Aortic Arch: Unremarkable. Great Vessels: Widely patent. Descending Thoracic Aorta: There is a small nipple-like protrusion from the   descending thoracic aorta at the level of the distal trachea which may have been   abortive attempt for aberrant right subclavian artery. This is unchanged from   2018 and of no significance. Abdominal Aorta: Normal in caliber without significant atheromatous disease. .       Visceral Branches:       Celiac Axis: Widely patent. SMA: Widely patent. Renals: Widely patent. JIMMIE: Widely patent. Iliofemoral runoff is unremarkable bilaterally       Osseous Structures Of Abdomen And Pelvis: Normal for age. 09/15/22 1229  CT HEAD WO CONT Final result    Impression:          No acute intracranial process. All CT scans at this facility are performed using dose optimization technique as   appropriate to the performed exam, to include automated exposure control,   adjustment of the mA and/or kV according to patient's size (Including   appropriate matching for site-specific examinations), or use of iterative   reconstruction technique. Narrative:  CT OF THE BRAIN       CPT CODE: 58198       COMPARISON: None. INDICATIONS: Dizzy. TECHNIQUE: Axial sections through the brain at 5 mm collimation without IV   contrast are obtained.        FINDINGS:         The ventricles are of normal size and configuration without midline shift. .   The brain parenchyma is generally of normal attenuation. . Gray-white   differentiation is satisfactory. No acute hemorrhage is evident. .   No mass lesions are evident. There are no pathologic calcifications. .   There are no pathologic fluid collections. .       The visible portions of the paranasal sinuses are clear. .                 CXR Results  (Last 48 hours)      None              Medical Decision Making   I am the first provider for this patient. I reviewed the vital signs, available nursing notes, past medical history, past surgical history, family history and social history. Vital Signs-Reviewed the patient's vital signs. Records Reviewed: Nursing Notes    Procedures:  Procedures    Provider Notes (Medical Decision Making): Dizziness followed by acute onset of low back pain radiating up to his chest and then down concerning for dissection by story. CTA is negative. Head CT is normal.  No orthostatic changes. 2 sets of troponin are negative and patient will be discharged home with PCP follow-up. He is currently not having any symptoms. MED RECONCILIATION:  No current facility-administered medications for this encounter. Current Outpatient Medications   Medication Sig    telmisartan (MICARDIS) 40 mg tablet TAKE 1 TABLET DAILY    dofetilide (TIKOSYN) 500 mcg capsule Take 1 Capsule by mouth two (2) times a day. Xarelto 20 mg tab tablet TAKE 1 TABLET DAILY    synthroid 50 mcg tablet Take 50 mcg by mouth Daily (before breakfast). fenofibrate nanocrystallized (TRICOR) 145 mg tablet Take 145 mg by mouth daily. krill oil 500 mg cap Take 1 Capsule by mouth daily. multivitamin (ONE A DAY) tablet Take 1 Tab by mouth daily. naproxen sodium (Aleve) 220 mg tablet Take 220 mg by mouth as needed. Disposition:  home    DISCHARGE NOTE:   2:59 PM    Pt has been reexamined.   Patient has no new complaints, changes, or physical findings. Care plan outlined and precautions discussed. Results of labs, CT, CTA were reviewed with the patient. All medications were reviewed with the patient. All of pt's questions and concerns were addressed. Patient was instructed and agrees to follow up with PCP, as well as to return to the ED upon further deterioration. Patient is ready to go home. Follow-up Information       Follow up With Specialties Details Why Contact Info    Cassie Shukla MD Internal Medicine Physician Schedule an appointment as soon as possible for a visit in 2 days  [address]      [id number] [place] [address] EMERGENCY DEPT Emergency Medicine  If symptoms worsen return immediately [address]  [phone]            Current Discharge Medication List            Diagnosis     Clinical Impression:   1. Dizziness    2.  Acute midline low back pain, unspecified whether sciatica present

## 2022-09-16 LAB
ATRIAL RATE: 89 BPM
CALCULATED P AXIS, ECG09: 84 DEGREES
CALCULATED R AXIS, ECG10: 51 DEGREES
CALCULATED T AXIS, ECG11: 72 DEGREES
DIAGNOSIS, 93000: NORMAL
P-R INTERVAL, ECG05: 230 MS
Q-T INTERVAL, ECG07: 410 MS
QRS DURATION, ECG06: 90 MS
QTC CALCULATION (BEZET), ECG08: 498 MS
VENTRICULAR RATE, ECG03: 89 BPM

## 2022-10-25 RX ORDER — RIVAROXABAN 20 MG/1
TABLET, FILM COATED ORAL
Qty: 90 TABLET | Refills: 3 | Status: SHIPPED | OUTPATIENT
Start: 2022-10-25

## 2023-01-02 RX ORDER — DOFETILIDE 0.5 MG/1
CAPSULE ORAL
Qty: 180 CAPSULE | Refills: 2 | Status: SHIPPED | OUTPATIENT
Start: 2023-01-02

## 2023-02-14 DIAGNOSIS — S50.01XD CONTUSION OF RIGHT ELBOW, SUBSEQUENT ENCOUNTER: Primary | ICD-10-CM

## 2023-03-27 ENCOUNTER — OFFICE VISIT (OUTPATIENT)
Age: 63
End: 2023-03-27
Payer: COMMERCIAL

## 2023-03-27 VITALS
SYSTOLIC BLOOD PRESSURE: 122 MMHG | DIASTOLIC BLOOD PRESSURE: 86 MMHG | BODY MASS INDEX: 36.18 KG/M2 | OXYGEN SATURATION: 98 % | HEIGHT: 75 IN | HEART RATE: 77 BPM | WEIGHT: 291 LBS

## 2023-03-27 VITALS
DIASTOLIC BLOOD PRESSURE: 79 MMHG | BODY MASS INDEX: 36.06 KG/M2 | WEIGHT: 290 LBS | OXYGEN SATURATION: 94 % | HEIGHT: 75 IN | HEART RATE: 79 BPM | SYSTOLIC BLOOD PRESSURE: 134 MMHG

## 2023-03-27 DIAGNOSIS — E78.5 DYSLIPIDEMIA: ICD-10-CM

## 2023-03-27 DIAGNOSIS — K43.2 INCISIONAL HERNIA, WITHOUT OBSTRUCTION OR GANGRENE: ICD-10-CM

## 2023-03-27 DIAGNOSIS — K40.90 RIGHT INGUINAL HERNIA: Primary | ICD-10-CM

## 2023-03-27 DIAGNOSIS — G47.33 OBSTRUCTIVE SLEEP APNEA SYNDROME: ICD-10-CM

## 2023-03-27 DIAGNOSIS — I48.0 PAROXYSMAL ATRIAL FIBRILLATION (HCC): Primary | ICD-10-CM

## 2023-03-27 DIAGNOSIS — E66.01 SEVERE OBESITY (HCC): ICD-10-CM

## 2023-03-27 DIAGNOSIS — Z98.890 STATUS POST CATHETER ABLATION OF ATRIAL FIBRILLATION: ICD-10-CM

## 2023-03-27 DIAGNOSIS — I10 ESSENTIAL HYPERTENSION: ICD-10-CM

## 2023-03-27 DIAGNOSIS — K43.9 VENTRAL HERNIA WITHOUT OBSTRUCTION OR GANGRENE: ICD-10-CM

## 2023-03-27 PROBLEM — R91.8 MULTIPLE PULMONARY NODULES: Status: ACTIVE | Noted: 2021-01-12

## 2023-03-27 PROCEDURE — 99214 OFFICE O/P EST MOD 30 MIN: CPT | Performed by: INTERNAL MEDICINE

## 2023-03-27 PROCEDURE — 99204 OFFICE O/P NEW MOD 45 MIN: CPT | Performed by: SURGERY

## 2023-03-27 PROCEDURE — 3074F SYST BP LT 130 MM HG: CPT | Performed by: SURGERY

## 2023-03-27 PROCEDURE — 3078F DIAST BP <80 MM HG: CPT | Performed by: SURGERY

## 2023-03-27 PROCEDURE — 3079F DIAST BP 80-89 MM HG: CPT | Performed by: INTERNAL MEDICINE

## 2023-03-27 PROCEDURE — 93000 ELECTROCARDIOGRAM COMPLETE: CPT | Performed by: INTERNAL MEDICINE

## 2023-03-27 PROCEDURE — 3074F SYST BP LT 130 MM HG: CPT | Performed by: INTERNAL MEDICINE

## 2023-03-27 RX ORDER — M-VIT,TX,IRON,MINS/CALC/FOLIC 27MG-0.4MG
1 TABLET ORAL DAILY
COMMUNITY

## 2023-03-27 ASSESSMENT — PATIENT HEALTH QUESTIONNAIRE - PHQ9
1. LITTLE INTEREST OR PLEASURE IN DOING THINGS: 0
2. FEELING DOWN, DEPRESSED OR HOPELESS: 0
SUM OF ALL RESPONSES TO PHQ9 QUESTIONS 1 & 2: 0
SUM OF ALL RESPONSES TO PHQ QUESTIONS 1-9: 0

## 2023-03-27 ASSESSMENT — ENCOUNTER SYMPTOMS
ABDOMINAL PAIN: 0
NAUSEA: 0
ABDOMINAL DISTENTION: 0
SHORTNESS OF BREATH: 1
VOMITING: 0
COUGH: 0
SORE THROAT: 0

## 2023-03-27 NOTE — PROGRESS NOTES
Matthew Layton presents today for   Chief Complaint   Patient presents with    Follow-up     6 month follow up       Matthew Layton preferred language for health care discussion is english/other. Is someone accompanying this pt? no    Is the patient using any DME equipment during OV? no    Depression Screening:  Depression: Not at risk    PHQ-2 Score: 0        Learning Assessment:  Who is the primary learner? Patient    What is the preferred language for health care of the primary learner? ENGLISH    How does the primary learner prefer to learn new concepts? DEMONSTRATION    Answered By patient    Relationship to Learner SELF           Pt currently taking Anticoagulant therapy? Xarelto 20 mg once a day    Pt currently taking Antiplatelet therapy ? no      Coordination of Care:  1. Have you been to the ER, urgent care clinic since your last visit? Hospitalized since your last visit? no    2. Have you seen or consulted any other health care providers outside of the [address] [place] [address] since your last visit? Include any pap smears or colon screening.  no
heard.    No gallop. Pulmonary:      Effort: Pulmonary effort is normal.      Breath sounds: No wheezing, rhonchi or rales. Abdominal:      General: Bowel sounds are normal. There is no distension. Palpations: Abdomen is soft. Tenderness: There is no abdominal tenderness. Musculoskeletal:         General: No swelling or deformity. Skin:     General: Skin is warm and dry. Findings: No rash. Neurological:      General: No focal deficit present. Mental Status: He is alert and oriented to person, place, and time. Psychiatric:         Mood and Affect: Mood normal.         Behavior: Behavior normal.       EKG: Normal sinus rhythm, first-degree AV block, normal axis, normal QTc interval, nonspecific T wave abnormality. No change compared to the previous EKG. Assessment / Plan:   Paroxysmal atrial fibrillation. Status post initial atrial fibrillation ablation in 2007. Patient was back in atrial fibrillation again in July 2020 suspected for at least six or more months. He poorly tolerates his arrhythmia due to fatigue and decreased activity tolerance. Attempted cardioversion in September 2020 maintain sinus rhythm for less than 2 weeks. He then underwent a repeat ablation in October 2020 with a pulmonary vein isolation procedure using a cryocatheter. Patient was back in atrial fibrillation 10 days later with similar symptoms. Patient then underwent elective Tikosyn loading in the hospital to begin of December 2020 and did convert back to sinus rhythm. Patient completed a 30-day event monitor in January 2021 to evaluate a near syncopal episode. He was asymptomatic during the 30 days. He was in sinus rhythm 92% of the time. Atrial fibrillation 8% of the time with average heart rate during atrial fibrillation episodes at 89 bpm.  No prolonged episodes of atrial fibrillation since last visit. I would continue his current regimen of dofetilide and Xarelto.     Essential

## 2023-03-27 NOTE — PROGRESS NOTES
Calvin Peterson is a 57 y.o. male (: 1960) presenting to address:    Chief Complaint   Patient presents with    New Patient     Right Inguinal hernia and umbilical hernia/ referred by Dr. Christy Acevedo       Medication list and allergies have been reviewed with Calvin Peterson and updated as of today's date. I have gone over all Medical, Surgical and Social History with Calvin Peterson and updated/added the information accordingly.
hernia and incisional hernia.  First I explained to the patient that his urinary problem with weak stream and nocturia could be related to a prostate issue but also it could be related to the bladder inside right inguinal hernia though I am not sure.  I did the best option is to get a urology consultation and make sure that the prostate is fine before deciding if we should repair his right inguinal hernia.  As for his incisional hernias they are asymptomatic and the patient has severe obesity so I advised him to lose weight before even discussing if we should repair these hernias.  The patient agreed on this plan    Plan:     Urology consultation which the patient will seek  Lose weight  Observation for now for his right inguinal hernia and incisional hernias  Follow-up with me in few months    Please call me if you have any questions (cell phone: [phone])     Signed By: Segun Honeycutt MD     March 27, 2023

## 2023-04-24 NOTE — PROGRESS NOTES
HISTORY OF PRESENT ILLNESS  Tatianna Borja is a 59 y.o. male. Follow-up  Pertinent negatives include no chest pain, no abdominal pain, no headaches and no shortness of breath. Palpitations   Associated symptoms include dizziness. Pertinent negatives include no fever, no chest pain, no claudication, no orthopnea, no PND, no abdominal pain, no nausea, no vomiting, no headaches, no cough and no shortness of breath. Dizziness  Pertinent negatives include no chest pain, no abdominal pain, no headaches and no shortness of breath. Patient presents for a follow-up office visit. Patient has a past medical history significant for paroxysmal atrial fibrillation, status post atrial fibrillation ablation in 2007. The patient also has a history of obstructive sleep apnea on nightly CPAP, dyslipidemia and hypothyroidism. Patient was found to be back in atrial fibrillation in July 2020 suspected for least 6 to 9 months given his symptom onset. He has been very symptomatic to his atrial fibrillation in the past.  He was started on Xarelto for oral anticoagulation and underwent an elective cardioversion in September 2020, which was initially successful, however he converted back to atrial fibrillation within a few weeks and was feeling again short of breath fatigued and dizzy with decreased activity tolerance. He ultimately underwent a pulmonary vein isolation atrial for ablation procedure with Dr. Tsering Fish at the end of October 2020. Approximately a week and a half later, he is found to be back in atrial fibrillation during a PCP follow-up visit. He underwent a follow-up echocardiogram earlier this month in November 2020 which showed preserved LV function, EF 55 to 60%, mild concentric LVH, mild right atrial and right ventricular shunt with normal PA pressures. Patient was hospitalized for elective dofetilide loading for 3 days at the beginning of December 2020. He did not have any proarrhythmic effects.   His S: Questions about Covid testing.  B: [place] sore throat that was swollen and red.  Was DX with Strep throat and started on amoxicillin. Has not other covid symptoms.  Calling today wondering if he should be checked for covid19.  A: Writer advised to complete [email or web address].  R: Adelfo will complete [email or web address] form.   Crystal Marcano RN, [facility]      Reason for Disposition    COVID-19 Testing, questions about    Protocols used: CORONAVIRUS (COVID-19) DIAGNOSED OR [id number] 5.16.20    COVID 19 Nurse Triage Plan/Patient Instructions    Please be aware that novel coronavirus (COVID-19) may be circulating in the community. If you develop symptoms such as fever, cough, or SOB or if you have concerns about the presence of another infection including coronavirus (COVID-19), please contact your health care provider or visit [email or web address].     Disposition/Instructions    Home care recommended. Follow home care protocol based instructions.    Thank you for taking steps to prevent the spread of this virus.  o Limit your contact with others.  o Wear a simple mask to cover your cough.  o Wash your hands well and often.    Resources    [facility]: About COVID-19: [email or web address]    CDC: What to Do If You're Sick: www.cdc.gov/coronavirus/2019-ncov/about/steps-when-sick.html    CDC: Ending Home Isolation: www.cdc.gov/coronavirus/2019-ncov/hcp/disposition-in-home-patients.html     CDC: Caring for Someone: www.cdc.gov/coronavirus/2019-ncov/if-you-are-sick/care-for-someone.html     [facility]: Interim Guidance for Hospital Discharge to Home: www.health.[facility].[place].us/diseases/coronavirus/hcp/hospdischarge.pdf    [facility] clinical trials (COVID-19 research studies): [email or web address].[email or web address]/umn-clinical-trials     Below are the COVID-19 hotlines at the [place] Department of Health ([facility]). Interpreters are available.   o For health questions: Call  [phone] or 1-916.880.9677 (7 a.m. to 7 p.m.)  o For questions about schools and childcare: Call 947-515-7877 or 1-133.801.3663 (7 a.m. to 7 p.m.)                      QTc interval remained normal.  He did convert back to sinus rhythm after 48 hours of the loading. He then wore a 30-day event monitor in January 2021 which showed normal sinus rhythm for the most part. He was in atrial fibrillation 8% of the time the majority of the time his atrial fibrillation rates were well controlled. He was last seen in our office approximately 6 months ago. Since last visit, the frequency of his heart palpitations are unchanged compared to his last visit. He does complain of some dizzy spells when he stands up too quickly or changes positions quickly but no rafaela syncope or near syncope. He denies any significant change in his activity tolerance. No new chest pain or shortness of breath. He did undergo foot surgery last week to remove a right bunion. Past Medical History:   Diagnosis Date    [address] [facility] [address] [address]     Dyslipidemia     History of cardiovascular stress test 09/2015    Normal stress echocardiogram    History of dizziness     Likely from autonomic insufficiency. Occasional brief loss of vision (10-15 secs).  History of echocardiogram 11/07/2012    EF 55-60%. No WMA. Normal RVSP/PASP.  Paroxysmal atrial fibrillation ([facility] [address] 2007    s/p afib ablation    S/P ablation of atrial fibrillation 2007    Sleep apnea 2005-6    CPAP at night    Thyroid disease       Current Outpatient Medications   Medication Sig Dispense Refill    dofetilide (TIKOSYN) 500 mcg capsule Take 1 Capsule by mouth two (2) times a day. 180 Capsule 3    Xarelto 20 mg tab tablet TAKE 1 TABLET DAILY 90 Tablet 3    telmisartan (MICARDIS) 40 mg tablet Take 1 Tablet by mouth daily. 90 Tablet 3    synthroid 50 mcg tablet Take 50 mcg by mouth Daily (before breakfast).  fenofibrate nanocrystallized (TRICOR) 145 mg tablet Take 145 mg by mouth daily.  krill oil 500 mg cap Take 1 Capsule by mouth daily.  multivitamin (ONE A DAY) tablet Take 1 Tab by mouth daily.       naproxen sodium (Aleve) 220 mg tablet Take 220 mg by mouth as needed. Allergies   Allergen Reactions    Cardizem [Diltiazem Hcl] Other (comments)     Headache. Patient states he is not allergic to this medication. Social History     Tobacco Use    Smoking status: Never Smoker    Smokeless tobacco: Never Used   Vaping Use    Vaping Use: Never used   Substance Use Topics    Alcohol use: No    Drug use: Never     No family history on file. Review of Systems   Constitutional: Negative for chills, fever and weight loss. HENT: Negative for nosebleeds. Eyes: Negative for blurred vision and double vision. Respiratory: Negative for cough, shortness of breath and wheezing. Cardiovascular: Positive for palpitations. Negative for chest pain, orthopnea, claudication, leg swelling and PND. Gastrointestinal: Negative for abdominal pain, heartburn, nausea and vomiting. Genitourinary: Negative for dysuria and hematuria. Musculoskeletal: Negative for falls and myalgias. Skin: Negative for rash. Neurological: Positive for dizziness. Negative for focal weakness and headaches. Endo/Heme/Allergies: Does not bruise/bleed easily. Psychiatric/Behavioral: Negative for substance abuse. Visit Vitals  /64 (BP 1 Location: Left upper arm, BP Patient Position: Sitting, BP Cuff Size: Adult)   Pulse 86   Ht 6' 3\" (1.905 m)   Wt 117.9 kg (260 lb)   SpO2 95%   BMI 32.50 kg/m²      Physical Exam  Constitutional:       Appearance: He is well-developed. HENT:      Head: Normocephalic and atraumatic. Eyes:      Conjunctiva/sclera: Conjunctivae normal.   Neck:      Vascular: No carotid bruit or JVD. Cardiovascular:      Rate and Rhythm: Normal rate and regular rhythm. Pulses: Normal pulses. Heart sounds: S1 normal and S2 normal. Heart sounds are distant. No murmur heard. No gallop. No S3 sounds. Pulmonary:      Breath sounds: Normal breath sounds. No wheezing or rales.    Abdominal: General: Bowel sounds are normal.      Palpations: Abdomen is soft. Tenderness: There is no abdominal tenderness. Musculoskeletal:         General: No swelling, tenderness or deformity. Cervical back: Neck supple. Skin:     General: Skin is warm and dry. Neurological:      Mental Status: He is alert and oriented to person, place, and time. Psychiatric:         Behavior: Behavior normal.         Thought Content: Thought content normal.       EKG:  Normal sinus rhythm, first-degree AV block, normal QTc interval, nonspecific T wave abnormality. Compared to the previous EKG, no significant change. ASSESSMENT and PLAN    Paroxysmal atrial fibrillation. Status post initial atrial fibrillation ablation in 2007. Patient was back in atrial fibrillation again in July 2020 suspected for at least six or more months. He poorly tolerates his arrhythmia due to fatigue and decreased activity tolerance. Attempted cardioversion in September 2020 maintain sinus rhythm for less than 2 weeks. He then underwent a repeat ablation at the end of October 2020 with a pulmonary vein isolation procedure using a cryocatheter. Patient was back in atrial fibrillation 10 days later with similar symptoms. Patient then underwent elective Tikosyn loading in the hospital to begin of December 2020 and did convert back to sinus rhythm. Patient completed a 30-day event monitor in January 2021 to evaluate a near syncopal episode. He was asymptomatic during the 30 days. He was in sinus rhythm 92% of the time. Atrial fibrillation 8% of the time with average heart rate during atrial fibrillation episodes at 89 bpm.  Patient with no significant increase in frequency and his heart palpitations from last visit. I would continue his current dose of Tikosyn and Xarelto long-term for oral anticoagulation. Essential hypertension. This now appears to be well controlled on telmisartan as monotherapy.   He can continue this There are no Wet Read(s) to document. regimen. Dyslipidemia. Patient primarily has elevated triglycerides and he has been managed with fenofibrate and krill oil by his PCP. Obstructive sleep apnea. Patient remains compliant with his nightly CPAP. Obesity. Patient lost over 20 pounds in weight from last visit. He was congratulated and encouraged to try to lose even more weight. Follow-up in 6 months, sooner if needed.

## 2023-05-09 NOTE — PROGRESS NOTES
Vivienne Alvarez. Presents today for evaluation of complaints of numbness in left arm from the shoulder to his hand (especially in digits 3-5), it occurs at anytime. He also complains of shortness of breath and occasional dizziness that only occurs when he bends over. He states that he is a  and can carry heavy material without experiencing chest pain. He also plays 18 holes of golf (often) without any difficulty. He reports that he has \"4 hernias\" that are currently being evaluated (2 in his abdomen and he believes 2 in his groin). CT scan showed the abdominal hernias and a right inguinal hernia with part of the urinary bladder within the hernia sac. He is going to be seen by urology (in about 2 weeks) and has been seen by a surgeon. He believes that surgery is likely in the future. He is a 57year old male with history of paroxysmal atrial fibrillation(s/p cardioversion in Sept 2020 and then AFIB ablation in Oct. 2020), obstructive sleep apnea (uses CPAP), dyslipidemia and hypothyroidism. He was last seen by Dr. Donnell Lai in March 2023. His last echo was done on 11/12/20 and it showed an EF of 55-60%, normal wall motion, PASP of 29 mmHg. Vivienne Alvarez. is a 57 y.o. male presents today for :  Chief Complaint   Patient presents with    Follow-up     Follow up for SOB with Exertion and Numbness pf left arm       PMH:  Past Medical History:   Diagnosis Date    Crohn disease ([address]     Dyslipidemia     History of cardiovascular stress test 09/2015    Normal stress echocardiogram    History of dizziness     Likely from autonomic insufficiency. Occasional brief loss of vision (10-15 secs). History of echocardiogram 11/07/2012    EF 55-60%. No WMA. Normal RVSP/PASP.       Paroxysmal atrial fibrillation ([address] 2007    s/p afib ablation    S/P ablation of atrial fibrillation 2007    Sleep apnea 2005-6    CPAP at night    Thyroid disease        PSH:  Past Surgical History:   Procedure Laterality

## 2023-05-10 ENCOUNTER — OFFICE VISIT (OUTPATIENT)
Age: 63
End: 2023-05-10
Payer: COMMERCIAL

## 2023-05-10 VITALS
WEIGHT: 286 LBS | SYSTOLIC BLOOD PRESSURE: 136 MMHG | DIASTOLIC BLOOD PRESSURE: 84 MMHG | BODY MASS INDEX: 35.56 KG/M2 | OXYGEN SATURATION: 97 % | HEIGHT: 75 IN | HEART RATE: 69 BPM

## 2023-05-10 DIAGNOSIS — E78.5 DYSLIPIDEMIA: ICD-10-CM

## 2023-05-10 DIAGNOSIS — I48.0 PAROXYSMAL ATRIAL FIBRILLATION (HCC): Primary | ICD-10-CM

## 2023-05-10 DIAGNOSIS — G47.33 OBSTRUCTIVE SLEEP APNEA SYNDROME: ICD-10-CM

## 2023-05-10 DIAGNOSIS — I10 ESSENTIAL HYPERTENSION: ICD-10-CM

## 2023-05-10 DIAGNOSIS — R42 DIZZINESS: ICD-10-CM

## 2023-05-10 DIAGNOSIS — E66.01 SEVERE OBESITY (HCC): ICD-10-CM

## 2023-05-10 DIAGNOSIS — K43.9 VENTRAL HERNIA WITHOUT OBSTRUCTION OR GANGRENE: ICD-10-CM

## 2023-05-10 DIAGNOSIS — R20.0 LEFT ARM NUMBNESS: ICD-10-CM

## 2023-05-10 DIAGNOSIS — Z98.890 STATUS POST CATHETER ABLATION OF ATRIAL FIBRILLATION: ICD-10-CM

## 2023-05-10 DIAGNOSIS — R06.02 SOB (SHORTNESS OF BREATH): ICD-10-CM

## 2023-05-10 PROCEDURE — 3075F SYST BP GE 130 - 139MM HG: CPT | Performed by: NURSE PRACTITIONER

## 2023-05-10 PROCEDURE — 3079F DIAST BP 80-89 MM HG: CPT | Performed by: NURSE PRACTITIONER

## 2023-05-10 PROCEDURE — 93000 ELECTROCARDIOGRAM COMPLETE: CPT | Performed by: NURSE PRACTITIONER

## 2023-05-10 PROCEDURE — 99213 OFFICE O/P EST LOW 20 MIN: CPT | Performed by: NURSE PRACTITIONER

## 2023-05-10 ASSESSMENT — ENCOUNTER SYMPTOMS
DIARRHEA: 0
CONSTIPATION: 0
WHEEZING: 0
BLOOD IN STOOL: 0
NAUSEA: 0
COUGH: 0
CHEST TIGHTNESS: 0
VOMITING: 0
ABDOMINAL DISTENTION: 0
SHORTNESS OF BREATH: 1

## 2023-05-10 ASSESSMENT — PATIENT HEALTH QUESTIONNAIRE - PHQ9
SUM OF ALL RESPONSES TO PHQ QUESTIONS 1-9: 0
SUM OF ALL RESPONSES TO PHQ QUESTIONS 1-9: 0
1. LITTLE INTEREST OR PLEASURE IN DOING THINGS: 0
SUM OF ALL RESPONSES TO PHQ QUESTIONS 1-9: 0
SUM OF ALL RESPONSES TO PHQ QUESTIONS 1-9: 0
SUM OF ALL RESPONSES TO PHQ9 QUESTIONS 1 & 2: 0
2. FEELING DOWN, DEPRESSED OR HOPELESS: 0

## 2023-05-10 NOTE — PATIENT INSTRUCTIONS
Pharmacologic nuclear stress test:  Dx:  SOB, dizziness, left arm numbness  Echocardiogram;  Dx:  SOB, dizziness, left arm numbness  Follow-up with Dr. London Benavides as scheduled and as needed

## 2023-05-10 NOTE — PROGRESS NOTES
Shanon Neil. presents today for   Chief Complaint   Patient presents with    Follow-up     Follow up for SOB with Exertion and Numbness pf left arm       Shanon Neil. preferred language for health care discussion is english/other. Is someone accompanying this pt? no    Is the patient using any DME equipment during OV? no    Depression Screening:  Depression: Not at risk    PHQ-2 Score: 0        Learning Assessment:  Who is the primary learner? Patient    What is the preferred language for health care of the primary learner? ENGLISH    How does the primary learner prefer to learn new concepts? DEMONSTRATION    Answered By patient    Relationship to Learner SELF           Pt currently taking Anticoagulant therapy? Xarelto 20 mg once a day    Pt currently taking Antiplatelet therapy ? no      Coordination of Care:  1. Have you been to the ER, urgent care clinic since your last visit? Hospitalized since your last visit? no    2. Have you seen or consulted any other health care providers outside of the [address] [place] [address] since your last visit? Include any pap smears or colon screening.  no

## 2023-07-06 ENCOUNTER — TELEPHONE (OUTPATIENT)
Age: 63
End: 2023-07-06

## 2023-07-06 NOTE — TELEPHONE ENCOUNTER
----- Message from MICHAEL Laurent NP sent at 7/6/2023  8:38 AM EDT -----  Collette Bank,    Please call and let him know that the stress test was normal.      Thank you,  Che Ware  ----- Message -----  From: Jazlyn Mccullough MD  Sent: 6/28/2023   1:01 PM EDT  To: MICHAEL Laurent NP

## 2023-07-17 RX ORDER — TELMISARTAN 40 MG/1
TABLET ORAL
Qty: 90 TABLET | Refills: 3 | Status: SHIPPED | OUTPATIENT
Start: 2023-07-17

## 2023-09-27 RX ORDER — RIVAROXABAN 20 MG/1
20 TABLET, FILM COATED ORAL DAILY
Qty: 90 TABLET | Refills: 3 | Status: SHIPPED | OUTPATIENT
Start: 2023-09-27

## 2023-09-28 ENCOUNTER — OFFICE VISIT (OUTPATIENT)
Age: 63
End: 2023-09-28
Payer: COMMERCIAL

## 2023-09-28 VITALS
HEART RATE: 61 BPM | DIASTOLIC BLOOD PRESSURE: 80 MMHG | HEIGHT: 75 IN | OXYGEN SATURATION: 97 % | BODY MASS INDEX: 35.93 KG/M2 | WEIGHT: 289 LBS | SYSTOLIC BLOOD PRESSURE: 130 MMHG

## 2023-09-28 DIAGNOSIS — E78.5 DYSLIPIDEMIA: ICD-10-CM

## 2023-09-28 DIAGNOSIS — G47.33 OBSTRUCTIVE SLEEP APNEA SYNDROME: ICD-10-CM

## 2023-09-28 DIAGNOSIS — I48.0 PAROXYSMAL ATRIAL FIBRILLATION (HCC): Primary | ICD-10-CM

## 2023-09-28 DIAGNOSIS — Z98.890 STATUS POST CATHETER ABLATION OF ATRIAL FIBRILLATION: ICD-10-CM

## 2023-09-28 DIAGNOSIS — E66.01 SEVERE OBESITY (HCC): ICD-10-CM

## 2023-09-28 DIAGNOSIS — I10 ESSENTIAL HYPERTENSION: ICD-10-CM

## 2023-09-28 PROBLEM — H90.5 SENSORINEURAL HEARING LOSS: Status: ACTIVE | Noted: 2023-09-28

## 2023-09-28 PROBLEM — H52.209 ASTIGMATISM: Status: ACTIVE | Noted: 2023-09-28

## 2023-09-28 PROBLEM — H52.10 MYOPIA: Status: ACTIVE | Noted: 2023-09-28

## 2023-09-28 PROBLEM — S61.209A OPEN WOUND OF FINGER: Status: ACTIVE | Noted: 2023-09-28

## 2023-09-28 PROBLEM — H52.4 PRESBYOPIA: Status: ACTIVE | Noted: 2023-09-28

## 2023-09-28 PROBLEM — Z77.090 EXPOSURE TO ASBESTOS: Status: ACTIVE | Noted: 2023-09-28

## 2023-09-28 PROCEDURE — 99214 OFFICE O/P EST MOD 30 MIN: CPT | Performed by: INTERNAL MEDICINE

## 2023-09-28 PROCEDURE — 3079F DIAST BP 80-89 MM HG: CPT | Performed by: INTERNAL MEDICINE

## 2023-09-28 PROCEDURE — 93000 ELECTROCARDIOGRAM COMPLETE: CPT | Performed by: INTERNAL MEDICINE

## 2023-09-28 PROCEDURE — 3075F SYST BP GE 130 - 139MM HG: CPT | Performed by: INTERNAL MEDICINE

## 2023-09-28 ASSESSMENT — ENCOUNTER SYMPTOMS
COUGH: 0
VOMITING: 0
ABDOMINAL PAIN: 0
SHORTNESS OF BREATH: 0
ABDOMINAL DISTENTION: 0
NAUSEA: 0
SORE THROAT: 0

## 2023-09-28 ASSESSMENT — ANXIETY QUESTIONNAIRES
3. WORRYING TOO MUCH ABOUT DIFFERENT THINGS: 0
5. BEING SO RESTLESS THAT IT IS HARD TO SIT STILL: 0
2. NOT BEING ABLE TO STOP OR CONTROL WORRYING: 0
7. FEELING AFRAID AS IF SOMETHING AWFUL MIGHT HAPPEN: 0
4. TROUBLE RELAXING: 0
6. BECOMING EASILY ANNOYED OR IRRITABLE: 0
1. FEELING NERVOUS, ANXIOUS, OR ON EDGE: 0
GAD7 TOTAL SCORE: 0

## 2023-09-28 ASSESSMENT — PATIENT HEALTH QUESTIONNAIRE - PHQ9
SUM OF ALL RESPONSES TO PHQ QUESTIONS 1-9: 0
1. LITTLE INTEREST OR PLEASURE IN DOING THINGS: 0
SUM OF ALL RESPONSES TO PHQ QUESTIONS 1-9: 0
SUM OF ALL RESPONSES TO PHQ9 QUESTIONS 1 & 2: 0
SUM OF ALL RESPONSES TO PHQ QUESTIONS 1-9: 0
SUM OF ALL RESPONSES TO PHQ QUESTIONS 1-9: 0
2. FEELING DOWN, DEPRESSED OR HOPELESS: 0

## 2023-09-28 NOTE — PROGRESS NOTES
09/28/23     Marianela Coreas  is a 57 y.o. male     Chief Complaint   Patient presents with    Follow-up     6 month       HPI    Patient presents for a follow-up office visit. Patient has a past medical history significant for paroxysmal atrial fibrillation, status post atrial fibrillation ablation in 2007. The patient also has a history of obstructive sleep apnea on nightly CPAP, dyslipidemia and hypothyroidism. Patient was found to be back in atrial fibrillation in July 2020 suspected for least 6 to 9 months given his symptom onset. He has been very symptomatic to his atrial fibrillation in the past.  He was started on Xarelto for oral anticoagulation and underwent an elective cardioversion in September 2020, which was initially successful, however he converted back to atrial fibrillation within a few weeks and was feeling again short of breath fatigued and dizzy with decreased activity tolerance. He ultimately underwent a pulmonary vein isolation atrial for ablation procedure with Dr. Mackenzie Romero at the end of October 2020. Approximately a week and a half later, he is found to be back in atrial fibrillation during a PCP follow-up visit. He underwent a follow-up echocardiogram earlier this month in November 2020 which showed preserved LV function, EF 55 to 60%, mild concentric LVH, mild right atrial and right ventricular shunt with normal PA pressures. Patient was hospitalized for elective dofetilide loading for 3 days at the beginning of December 2020. He did not have any proarrhythmic effects. He converted back to sinus rhythm after 48 hours of the loading. He then wore a 30-day event monitor in January 2021 which showed normal sinus rhythm for the most part. He was in atrial fibrillation 8% of the time the majority of the time his atrial fibrillation rates were well controlled. He was last seen in our office 6 months ago.   He underwent a repeat echocardiogram in June 2023 which demonstrated

## 2023-09-28 NOTE — PROGRESS NOTES
Max Rogel. presents today for   Chief Complaint   Patient presents with    Follow-up     6 month       Max Rogel. preferred language for health care discussion is english/other. Is someone accompanying this pt? no    Is the patient using any DME equipment during OV? no    Depression Screening:  Depression: Not at risk (9/28/2023)    PHQ-2     PHQ-2 Score: 0        Learning Assessment:  Who is the primary learner? Patient    What is the preferred language for health care of the primary learner? ENGLISH    How does the primary learner prefer to learn new concepts? DEMONSTRATION    Answered By patient    Relationship to Learner SELF           Pt currently taking Anticoagulant therapy? Xarelto 20 mg daily    Pt currently taking Antiplatelet therapy ? no      Coordination of Care:  1. Have you been to the ER, urgent care clinic since your last visit? Hospitalized since your last visit? no    2. Have you seen or consulted any other health care providers outside of the [address] [place] [address] since your last visit? Include any pap smears or colon screening.  no

## 2023-12-03 NOTE — Clinical Note
Anesthesia present for case.   Refer to anesthesia log for vitals
Defibrillation and grounding pads were applied.
History and physical documented and up to date, allergies reviewed, lab results reviewed, pre-procedure education provided, patient verbalized understanding of procedure, procedural consent signed and patient is NPO.
ID band present and verified. Family is not present.
No contrast administered during procedure. Amount: 0 mL.
No specimen collected. Estimated Blood Loss: (not applicable).
Physician has arrived.
TRANSFER - IN REPORT:     Verbal report received from: kelin medina rn. Report consisted of patient's Situation, Background, Assessment and   Recommendations(SBAR). Opportunity for questions and clarification was provided. Assessment completed upon patient's arrival to unit and care assumed. Patient transported with a Cardiac Cath Tech / Patient Care Tech.
The physician has been notified.
show

## 2024-01-23 RX ORDER — DOFETILIDE 0.5 MG/1
500 CAPSULE ORAL 2 TIMES DAILY
Qty: 180 CAPSULE | Refills: 2 | Status: SHIPPED | OUTPATIENT
Start: 2024-01-23

## 2024-02-15 ENCOUNTER — CLINICAL DOCUMENTATION (OUTPATIENT)
Age: 64
End: 2024-02-15

## 2024-03-28 ENCOUNTER — OFFICE VISIT (OUTPATIENT)
Age: 64
End: 2024-03-28
Payer: MEDICARE

## 2024-03-28 VITALS
OXYGEN SATURATION: 96 % | SYSTOLIC BLOOD PRESSURE: 128 MMHG | HEIGHT: 75 IN | DIASTOLIC BLOOD PRESSURE: 82 MMHG | HEART RATE: 68 BPM | BODY MASS INDEX: 34.82 KG/M2 | WEIGHT: 280 LBS

## 2024-03-28 DIAGNOSIS — Z98.890 STATUS POST CATHETER ABLATION OF ATRIAL FIBRILLATION: ICD-10-CM

## 2024-03-28 DIAGNOSIS — E78.5 DYSLIPIDEMIA: ICD-10-CM

## 2024-03-28 DIAGNOSIS — E66.01 SEVERE OBESITY (HCC): ICD-10-CM

## 2024-03-28 DIAGNOSIS — I10 ESSENTIAL HYPERTENSION: ICD-10-CM

## 2024-03-28 DIAGNOSIS — I48.0 PAROXYSMAL ATRIAL FIBRILLATION (HCC): Primary | ICD-10-CM

## 2024-03-28 DIAGNOSIS — G47.33 OBSTRUCTIVE SLEEP APNEA SYNDROME: ICD-10-CM

## 2024-03-28 PROCEDURE — 3079F DIAST BP 80-89 MM HG: CPT | Performed by: INTERNAL MEDICINE

## 2024-03-28 PROCEDURE — 93000 ELECTROCARDIOGRAM COMPLETE: CPT | Performed by: INTERNAL MEDICINE

## 2024-03-28 PROCEDURE — 1123F ACP DISCUSS/DSCN MKR DOCD: CPT | Performed by: INTERNAL MEDICINE

## 2024-03-28 PROCEDURE — G8484 FLU IMMUNIZE NO ADMIN: HCPCS | Performed by: INTERNAL MEDICINE

## 2024-03-28 PROCEDURE — 3074F SYST BP LT 130 MM HG: CPT | Performed by: INTERNAL MEDICINE

## 2024-03-28 PROCEDURE — 3017F COLORECTAL CA SCREEN DOC REV: CPT | Performed by: INTERNAL MEDICINE

## 2024-03-28 PROCEDURE — G8427 DOCREV CUR MEDS BY ELIG CLIN: HCPCS | Performed by: INTERNAL MEDICINE

## 2024-03-28 PROCEDURE — G8417 CALC BMI ABV UP PARAM F/U: HCPCS | Performed by: INTERNAL MEDICINE

## 2024-03-28 PROCEDURE — 99214 OFFICE O/P EST MOD 30 MIN: CPT | Performed by: INTERNAL MEDICINE

## 2024-03-28 PROCEDURE — 1036F TOBACCO NON-USER: CPT | Performed by: INTERNAL MEDICINE

## 2024-03-28 ASSESSMENT — PATIENT HEALTH QUESTIONNAIRE - PHQ9
SUM OF ALL RESPONSES TO PHQ9 QUESTIONS 1 & 2: 0
SUM OF ALL RESPONSES TO PHQ QUESTIONS 1-9: 0
2. FEELING DOWN, DEPRESSED OR HOPELESS: NOT AT ALL
SUM OF ALL RESPONSES TO PHQ QUESTIONS 1-9: 0
1. LITTLE INTEREST OR PLEASURE IN DOING THINGS: NOT AT ALL
SUM OF ALL RESPONSES TO PHQ QUESTIONS 1-9: 0
SUM OF ALL RESPONSES TO PHQ QUESTIONS 1-9: 0

## 2024-03-28 ASSESSMENT — ENCOUNTER SYMPTOMS
SHORTNESS OF BREATH: 0
NAUSEA: 0
ABDOMINAL PAIN: 0
SORE THROAT: 0
COUGH: 0
VOMITING: 0
ABDOMINAL DISTENTION: 0

## 2024-03-28 NOTE — PROGRESS NOTES
Kapil Leo Jr. presents today for   Chief Complaint   Patient presents with    Follow-up     6 months    Dizziness       Kapil Leo Jr. preferred language for health care discussion is english/other.    Is someone accompanying this pt? no    Is the patient using any DME equipment during OV? no    Depression Screening:  Depression: Not at risk (3/28/2024)    PHQ-2     PHQ-2 Score: 0        Learning Assessment:  Who is the primary learner? Patient    What is the preferred language for health care of the primary learner? ENGLISH    How does the primary learner prefer to learn new concepts? DEMONSTRATION    Answered By patient    Relationship to Learner SELF           Pt currently taking Anticoagulant therapy?xarelto    Pt currently taking Antiplatelet therapy ? no      Coordination of Care:  1. Have you been to the ER, urgent care clinic since your last visit? Hospitalized since your last visit? no    2. Have you seen or consulted any other health care providers outside of the [facility] since your last visit? Include any pap smears or colon screening. no

## 2024-03-28 NOTE — PROGRESS NOTES
03/28/24     Kapil Leo Jr.  is a 63 y.o. male     Chief Complaint   Patient presents with    Follow-up     6 months    Dizziness       Dizziness  Pertinent negatives include no abdominal pain, arthralgias, chest pain, chills, congestion, coughing, fatigue, fever, headaches, nausea, rash, sore throat, vomiting or weakness.       Patient presents for a follow-up office visit.  Patient has a past medical history significant for paroxysmal atrial fibrillation, status post atrial fibrillation ablation in 2007.  The patient also has a history of obstructive sleep apnea on nightly CPAP, dyslipidemia and hypothyroidism.    Patient was found to be back in atrial fibrillation in July 2020 suspected for least 6 to 9 months given his symptom onset.  He has been very symptomatic to his atrial fibrillation in the past.  He was started on Xarelto for oral anticoagulation and underwent an elective cardioversion in September 2020, which was initially successful, however he converted back to atrial fibrillation within a few weeks and was feeling again short of breath fatigued and dizzy with decreased activity tolerance.  He ultimately underwent a pulmonary vein isolation atrial for ablation procedure with Dr. Pickering at the end of October 2020.  Approximately a week and a half later, he is found to be back in atrial fibrillation during a PCP follow-up visit.  He underwent a follow-up echocardiogram earlier this month in November 2020 which showed preserved LV function, EF 55 to 60%, mild concentric LVH, mild right atrial and right ventricular shunt with normal PA pressures.    Patient was hospitalized for elective dofetilide loading for 3 days at the beginning of December 2020.  He did not have any proarrhythmic effects.  He converted back to sinus rhythm after 48 hours of the loading.  He then wore a 30-day event monitor in January 2021 which showed normal sinus rhythm for the most part.  He was in atrial fibrillation 8% of the

## 2024-06-05 ENCOUNTER — APPOINTMENT (OUTPATIENT)
Facility: HOSPITAL | Age: 64
End: 2024-06-05
Payer: MEDICARE

## 2024-06-11 ENCOUNTER — HOSPITAL ENCOUNTER (OUTPATIENT)
Facility: HOSPITAL | Age: 64
Setting detail: RECURRING SERIES
Discharge: HOME | End: 2024-06-14
Payer: MEDICARE

## 2024-06-11 PROCEDURE — 97162 PT EVAL MOD COMPLEX 30 MIN: CPT | Performed by: PHYSICAL THERAPIST

## 2024-06-11 PROCEDURE — 97112 NEUROMUSCULAR REEDUCATION: CPT | Performed by: PHYSICAL THERAPIST

## 2024-06-11 NOTE — PROGRESS NOTES
[facility]  [address], [place], [address] Phone: [phone] Fax [phone]  Plan of Care / Statement of Necessity for Physical Therapy Services     Patient Name: Kapil Leo Jr. : 1960   Medical   Diagnosis: Dizziness and giddiness [R42] Treatment Diagnosis: R42   Dizziness and giddiness     Onset Date: Chronic, several years Payor   Payor: MEDICARE / Plan: MEDICARE PART A AND B / Product Type: *No Product type* /    Referral Source: Johnnie Fairbanks MD Start of Care (SOC): 2024   Prior Hospitalization: See medical history Provider #: [id number]   Prior Level of Function: IND, works as a    Comorbidities: C-PAP, A-fib, high cholesterol, HBP,      Assessment / key information:  Pt is a 63 year old male with subjective complaints of dizziness that has been going on for awhile and he has never done anything about. He reports that sitting here he is a little light headed but nothing bad. He reports that he has high blood pressure and that could be contributing to symptoms. Pt reports that he feels dizzy and off balance with bending forward, turning too quickly, moving too fast, and rolling over in bed. He reports that he sits still the feeling subsides within 30 seconds. He has had a cardiac workup and that was negative. He also stated that he had a VNG workup of the inner ear and he reports that was negative. He reports that he has numbness and tingling in the L hand. He reports that he has not been to the eye doctor in several years. Negative for red flags. Upon evaluation, pt presents with abnormal tracking with saccadic movements however, he was negative for saccades, increased dizziness with quick head movements and VORx1 indicating dizziness with quick head movements. He was negative for fauzia hernandez pike indicating negative BPPV. Pt would benefit from a course of skilled PT to address above deficits to be

## 2024-06-11 NOTE — PROGRESS NOTES
PHYSICAL / OCCUPATIONAL THERAPY - DAILY TREATMENT NOTE (updated )  For Eval visit    Patient Name: Kapil Leo Jr.    Date: 2024    : 1960  Insurance: Payor: MEDICARE / Plan: MEDICARE PART A AND B / Product Type: *No Product type* /      Patient  verified yes     Visit #   Current / Total 1 12   Time   In / Out [id number]   Total Treatment  Time  41   Pain   In / Out 0 0   Subjective Functional Status/Changes: See below     NEXT PROGRESS NOTE DUE: 24    TREATMENT AREA =  Dizziness and giddiness [R42]    SUBJECTIVE:  Pt is a 63 year old male with subjective complaints of dizziness that has been going on for awhile and he has never done anything about. He reports that sitting here he is a little light headed but nothing bad. He reports that he has high blood pressure and that could be contributing to symptoms. Pt reports that he feels dizzy and off balance with bending forward, turning too quickly, moving too fast, and rolling over in bed. He reports that he sits still the feeling subsides within 30 seconds. He has had a cardiac workup and that was negative. He also stated that he had a VNG workup of the inner ear and he reports that was negative. He reports that he has numbness and tingling in the L hand. He reports that he has not been to the eye doctor in several years. Negative for red flags.     Co-morbidities: C-PAP, A-fib, high cholesterol, HBP,         OBJECTIVE    Modalities Rationale:     decrease pain and increase tissue extensibility to improve patient's ability to progress to PLOF and address remaining functional goals.     min [] Estim Unattended, type/location:                                      []  w/ice    []  w/heat    min [] Estim Attended, type/location:                                     []  w/US     []  w/ice    []  w/heat    []  TENS insruct      min []  Mechanical Traction: type/lbs                   []  pro   []  sup   []  int   []  cont    []  before manual    []

## 2024-06-19 ENCOUNTER — HOSPITAL ENCOUNTER (OUTPATIENT)
Facility: HOSPITAL | Age: 64
Setting detail: RECURRING SERIES
Discharge: HOME | End: 2024-06-22
Payer: MEDICARE

## 2024-06-19 PROCEDURE — 97112 NEUROMUSCULAR REEDUCATION: CPT | Performed by: PHYSICAL THERAPIST

## 2024-06-19 NOTE — PROGRESS NOTES
assessment post-treatment (if applicable):    []  intact    []  redness- no adverse reaction                 []redness - adverse reaction:        Therapeutic Procedures:  Tx Min Billable or 1:1 Min (if diff from Tx Min) Procedure, Rationale, Specifics   36  42224 Neuromuscular Re-Education (timed):  improve balance, coordination, kinesthetic sense, posture, core stability and proprioception to improve patient's ability to develop conscious control of individual muscles and awareness of position of extremities in order to progress to PLOF and address remaining functional goals. (see flow sheet as applicable)     Details if applicable:    Bending over to  object from seated/standing  Ball toss   Static balance acts per flow sheet  VORx1 in front of a busy background            Details if applicable:            Details if applicable:            Details if applicable:     36  MC BC Totals Reminder: bill using total billable min of TIMED therapeutic procedures (example: do not include dry needle or estim unattended, both untimed codes, in totals to left)  8-22 min = 1 unit; 23-37 min = 2 units; 38-52 min = 3 units; 53-67 min = 4 units; 68-82 min = 5 units   Total Total     [x]  Patient Education billed concurrently with other procedures   [x] Review HEP    [] Progressed/Changed HEP, detail:    [] Other detail:       Objective Information/Functional Measures/Assessment:  - from sitting picking up an object did not elicit dizziness however, from standing position he had increased 1-2/10  - slight increase in dizziness with ball toss in standing to 1-2  - static balance acts- refer to flow sheet however, pt only challenged with tandem stance with EC   - high level- high functioning vestibular system  -VORx1 sitting in front of a busy back ground increased symptoms to 4-5/10   -updated HEP this session    Patient will continue to benefit from skilled PT / OT services to modify and progress therapeutic interventions,

## 2024-06-27 ENCOUNTER — APPOINTMENT (OUTPATIENT)
Facility: HOSPITAL | Age: 64
End: 2024-06-27
Payer: MEDICARE

## 2024-08-06 RX ORDER — TELMISARTAN 40 MG/1
40 TABLET ORAL DAILY
Qty: 90 TABLET | Refills: 3 | Status: SHIPPED | OUTPATIENT
Start: 2024-08-06

## 2024-09-30 ENCOUNTER — OFFICE VISIT (OUTPATIENT)
Age: 64
End: 2024-09-30
Payer: MEDICARE

## 2024-09-30 VITALS
WEIGHT: 291 LBS | BODY MASS INDEX: 36.18 KG/M2 | HEART RATE: 79 BPM | SYSTOLIC BLOOD PRESSURE: 122 MMHG | OXYGEN SATURATION: 97 % | DIASTOLIC BLOOD PRESSURE: 76 MMHG | HEIGHT: 75 IN

## 2024-09-30 DIAGNOSIS — E66.01 SEVERE OBESITY: ICD-10-CM

## 2024-09-30 DIAGNOSIS — I48.0 PAROXYSMAL ATRIAL FIBRILLATION (HCC): Primary | ICD-10-CM

## 2024-09-30 DIAGNOSIS — E78.5 DYSLIPIDEMIA: ICD-10-CM

## 2024-09-30 DIAGNOSIS — G47.33 OBSTRUCTIVE SLEEP APNEA SYNDROME: ICD-10-CM

## 2024-09-30 DIAGNOSIS — I10 ESSENTIAL HYPERTENSION: ICD-10-CM

## 2024-09-30 DIAGNOSIS — Z98.890 STATUS POST CATHETER ABLATION OF ATRIAL FIBRILLATION: ICD-10-CM

## 2024-09-30 PROCEDURE — 93000 ELECTROCARDIOGRAM COMPLETE: CPT | Performed by: INTERNAL MEDICINE

## 2024-09-30 PROCEDURE — 99214 OFFICE O/P EST MOD 30 MIN: CPT | Performed by: INTERNAL MEDICINE

## 2024-09-30 PROCEDURE — 1123F ACP DISCUSS/DSCN MKR DOCD: CPT | Performed by: INTERNAL MEDICINE

## 2024-09-30 PROCEDURE — 1036F TOBACCO NON-USER: CPT | Performed by: INTERNAL MEDICINE

## 2024-09-30 PROCEDURE — 3074F SYST BP LT 130 MM HG: CPT | Performed by: INTERNAL MEDICINE

## 2024-09-30 PROCEDURE — G8417 CALC BMI ABV UP PARAM F/U: HCPCS | Performed by: INTERNAL MEDICINE

## 2024-09-30 PROCEDURE — G8427 DOCREV CUR MEDS BY ELIG CLIN: HCPCS | Performed by: INTERNAL MEDICINE

## 2024-09-30 PROCEDURE — 3017F COLORECTAL CA SCREEN DOC REV: CPT | Performed by: INTERNAL MEDICINE

## 2024-09-30 PROCEDURE — 3078F DIAST BP <80 MM HG: CPT | Performed by: INTERNAL MEDICINE

## 2024-09-30 ASSESSMENT — ENCOUNTER SYMPTOMS
NAUSEA: 0
COUGH: 0
SORE THROAT: 0
SHORTNESS OF BREATH: 0
ABDOMINAL PAIN: 0
ABDOMINAL DISTENTION: 0
VOMITING: 0

## 2024-09-30 ASSESSMENT — PATIENT HEALTH QUESTIONNAIRE - PHQ9
SUM OF ALL RESPONSES TO PHQ QUESTIONS 1-9: 0
1. LITTLE INTEREST OR PLEASURE IN DOING THINGS: NOT AT ALL
SUM OF ALL RESPONSES TO PHQ9 QUESTIONS 1 & 2: 0
SUM OF ALL RESPONSES TO PHQ QUESTIONS 1-9: 0
2. FEELING DOWN, DEPRESSED OR HOPELESS: NOT AT ALL

## 2024-09-30 ASSESSMENT — ANXIETY QUESTIONNAIRES
7. FEELING AFRAID AS IF SOMETHING AWFUL MIGHT HAPPEN: NOT AT ALL
5. BEING SO RESTLESS THAT IT IS HARD TO SIT STILL: NOT AT ALL
2. NOT BEING ABLE TO STOP OR CONTROL WORRYING: NOT AT ALL
GAD7 TOTAL SCORE: 0
4. TROUBLE RELAXING: NOT AT ALL
6. BECOMING EASILY ANNOYED OR IRRITABLE: NOT AT ALL
1. FEELING NERVOUS, ANXIOUS, OR ON EDGE: NOT AT ALL
3. WORRYING TOO MUCH ABOUT DIFFERENT THINGS: NOT AT ALL

## 2024-09-30 NOTE — PROGRESS NOTES
Kapil Leo Jr. presents today for   Chief Complaint   Patient presents with    Follow-up     6 month       Kapil Leo Jr. preferred language for health care discussion is english/other.    Is someone accompanying this pt? no    Is the patient using any DME equipment during OV? no    Depression Screening:  Depression: Not at risk (9/30/2024)    PHQ-2     PHQ-2 Score: 0        Learning Assessment:  Who is the primary learner? Patient    What is the preferred language for health care of the primary learner? ENGLISH    How does the primary learner prefer to learn new concepts? DEMONSTRATION    Answered By patient    Relationship to Learner SELF           Pt currently taking Anticoagulant therapy? Xarelto 20 mg daily    Pt currently taking Antiplatelet therapy ? no      Coordination of Care:  1. Have you been to the ER, urgent care clinic since your last visit? Hospitalized since your last visit? no    2. Have you seen or consulted any other health care providers outside of the [facility] since your last visit? Include any pap smears or colon screening. no    
   Smokeless tobacco: Never   Vaping Use    Vaping status: Never Used   Substance Use Topics    Alcohol use: No    Drug use: Never     Family History   Problem Relation Age of Onset    No Known Problems Mother     No Known Problems Father     No Known Problems Sister     No Known Problems Brother     No Known Problems Maternal Grandmother     No Known Problems Maternal Grandfather     No Known Problems Paternal Grandmother     No Known Problems Paternal Grandfather     No Known Problems Maternal Aunt     No Known Problems Maternal Uncle     No Known Problems Paternal Aunt     No Known Problems Paternal Uncle     No Known Problems Other          Review of Systems   Constitutional:  Negative for chills, fatigue and fever.   HENT:  Negative for congestion, hearing loss, nosebleeds and sore throat.    Eyes:  Negative for visual disturbance.   Respiratory:  Negative for cough and shortness of breath.    Cardiovascular:  Positive for palpitations. Negative for chest pain and leg swelling.   Gastrointestinal:  Negative for abdominal distention, abdominal pain, nausea and vomiting.   Endocrine: Negative for cold intolerance and heat intolerance.   Genitourinary:  Negative for dysuria.   Musculoskeletal:  Negative for arthralgias.   Skin:  Negative for rash.   Neurological:  Negative for dizziness, syncope, weakness and headaches.   Hematological:  Does not bruise/bleed easily.   Psychiatric/Behavioral:  Negative for suicidal ideas.          /76 (Site: Left Upper Arm, Position: Sitting, Cuff Size: Medium Adult)   Pulse 79   Ht 1.905 m (6' 3\")   Wt 132 kg (291 lb)   SpO2 97%   BMI 36.37 kg/m²     Objective:   Physical Exam  Constitutional:       General: He is not in acute distress.  HENT:      Head: Normocephalic.   Neck:      Vascular: No carotid bruit or JVD.   Cardiovascular:      Rate and Rhythm: Normal rate and regular rhythm. No extrasystoles are present.     Heart sounds: No murmur heard.     No gallop.

## 2024-10-15 RX ORDER — DOFETILIDE 0.5 MG/1
500 CAPSULE ORAL 2 TIMES DAILY
Qty: 180 CAPSULE | Refills: 2 | Status: SHIPPED | OUTPATIENT
Start: 2024-10-15

## 2024-11-12 RX ORDER — RIVAROXABAN 20 MG/1
20 TABLET, FILM COATED ORAL DAILY
Qty: 90 TABLET | Refills: 3 | Status: SHIPPED | OUTPATIENT
Start: 2024-11-12 | End: 2024-11-13

## 2024-11-13 RX ORDER — RIVAROXABAN 20 MG/1
TABLET, FILM COATED ORAL
Qty: 90 TABLET | Refills: 3 | Status: SHIPPED | OUTPATIENT
Start: 2024-11-13

## 2024-11-13 NOTE — TELEPHONE ENCOUNTER
pharmacy comment: XARELTO TAB 20MG is on  backorder.To prescribe an alternate please respond with appropriate changes or comment to Pharmacy (IF applicable, please review your patients formulary for alternate).  XARELTO TAB 20MG is on  backorder.To prescribe an alternate please respond with appropriate changes or comment to Pharmacy (IF applicable, please review your patients formulary for alternate).

## 2025-01-21 ENCOUNTER — TELEPHONE (OUTPATIENT)
Age: 65
End: 2025-01-21

## 2025-01-21 NOTE — TELEPHONE ENCOUNTER
Prior Authorization was done for [place]. It was approved from 01-17-25 to 01-17-26.    -scanned into this encounter

## 2025-04-01 ENCOUNTER — OFFICE VISIT (OUTPATIENT)
Age: 65
End: 2025-04-01
Payer: MEDICARE

## 2025-04-01 VITALS
BODY MASS INDEX: 34.19 KG/M2 | SYSTOLIC BLOOD PRESSURE: 116 MMHG | DIASTOLIC BLOOD PRESSURE: 82 MMHG | HEIGHT: 75 IN | WEIGHT: 275 LBS | OXYGEN SATURATION: 97 % | HEART RATE: 70 BPM

## 2025-04-01 DIAGNOSIS — E78.5 DYSLIPIDEMIA: ICD-10-CM

## 2025-04-01 DIAGNOSIS — I48.0 PAROXYSMAL ATRIAL FIBRILLATION (HCC): Primary | ICD-10-CM

## 2025-04-01 DIAGNOSIS — I10 ESSENTIAL HYPERTENSION: ICD-10-CM

## 2025-04-01 DIAGNOSIS — Z98.890 STATUS POST CATHETER ABLATION OF ATRIAL FIBRILLATION: ICD-10-CM

## 2025-04-01 DIAGNOSIS — G47.33 OBSTRUCTIVE SLEEP APNEA SYNDROME: ICD-10-CM

## 2025-04-01 DIAGNOSIS — E66.01 SEVERE OBESITY: ICD-10-CM

## 2025-04-01 PROCEDURE — 1159F MED LIST DOCD IN RCRD: CPT | Performed by: INTERNAL MEDICINE

## 2025-04-01 PROCEDURE — 3074F SYST BP LT 130 MM HG: CPT | Performed by: INTERNAL MEDICINE

## 2025-04-01 PROCEDURE — 99214 OFFICE O/P EST MOD 30 MIN: CPT | Performed by: INTERNAL MEDICINE

## 2025-04-01 PROCEDURE — G8427 DOCREV CUR MEDS BY ELIG CLIN: HCPCS | Performed by: INTERNAL MEDICINE

## 2025-04-01 PROCEDURE — 3017F COLORECTAL CA SCREEN DOC REV: CPT | Performed by: INTERNAL MEDICINE

## 2025-04-01 PROCEDURE — 1126F AMNT PAIN NOTED NONE PRSNT: CPT | Performed by: INTERNAL MEDICINE

## 2025-04-01 PROCEDURE — 93000 ELECTROCARDIOGRAM COMPLETE: CPT | Performed by: INTERNAL MEDICINE

## 2025-04-01 PROCEDURE — 1036F TOBACCO NON-USER: CPT | Performed by: INTERNAL MEDICINE

## 2025-04-01 PROCEDURE — G8417 CALC BMI ABV UP PARAM F/U: HCPCS | Performed by: INTERNAL MEDICINE

## 2025-04-01 PROCEDURE — 3079F DIAST BP 80-89 MM HG: CPT | Performed by: INTERNAL MEDICINE

## 2025-04-01 PROCEDURE — 1160F RVW MEDS BY RX/DR IN RCRD: CPT | Performed by: INTERNAL MEDICINE

## 2025-04-01 PROCEDURE — 1123F ACP DISCUSS/DSCN MKR DOCD: CPT | Performed by: INTERNAL MEDICINE

## 2025-04-01 ASSESSMENT — ENCOUNTER SYMPTOMS
ABDOMINAL DISTENTION: 0
VOMITING: 0
NAUSEA: 0
SORE THROAT: 0
SHORTNESS OF BREATH: 0
ABDOMINAL PAIN: 0
COUGH: 0

## 2025-04-01 NOTE — PROGRESS NOTES
Kapil Leo Jr. presents today for   Chief Complaint   Patient presents with    Follow-up     6 month     Palpitations     Racing palps at times for a coupe of months now        Kapil Leo Jr. preferred language for health care discussion is english/other.    Is someone accompanying this pt? no    Is the patient using any DME equipment during OV? no    Depression Screening:  Depression: Not at risk (9/30/2024)    PHQ-2     PHQ-2 Score: 0        Learning Assessment:  Who is the primary learner? Patient    What is the preferred language for health care of the primary learner? ENGLISH    How does the primary learner prefer to learn new concepts? DEMONSTRATION    Answered By patient    Relationship to Learner SELF           Pt currently taking Anticoagulant therapy? XARELTO 20 MG QD    Pt currently taking Antiplatelet therapy ? no      Coordination of Care:  1. Have you been to the ER, urgent care clinic since your last visit? Hospitalized since your last visit? no    2. Have you seen or consulted any other health care providers outside of the [facility] since your last visit? Include any pap smears or colon screening. no

## 2025-04-01 NOTE — PROGRESS NOTES
04/01/25     Kapil Leo Jr.  is a 64 y.o. male     Chief Complaint   Patient presents with    Follow-up    Palpitations     Racing palps at times for a coupe of months now        HPI    Patient presents for a follow-up office visit.  Patient has a past medical history significant for paroxysmal atrial fibrillation, status post atrial fibrillation ablation in 2007.  The patient also has a history of obstructive sleep apnea on nightly CPAP, dyslipidemia and hypothyroidism.    Patient was found to be back in atrial fibrillation in July 2020 suspected for least 6 to 9 months given his symptom onset.  He has been very symptomatic to his atrial fibrillation in the past.  He was started on Xarelto for oral anticoagulation and underwent an elective cardioversion in September 2020, which was initially successful, however he converted back to atrial fibrillation within a few weeks and was feeling again short of breath fatigued and dizzy with decreased activity tolerance.  He ultimately underwent a pulmonary vein isolation atrial for ablation procedure with Dr. Pickering at the end of October 2020.  Approximately a week and a half later, he is found to be back in atrial fibrillation during a PCP follow-up visit.  He underwent a follow-up echocardiogram earlier this month in November 2020 which showed preserved LV function, EF 55 to 60%, mild concentric LVH, mild right atrial and right ventricular shunt with normal PA pressures.    Patient was hospitalized for elective dofetilide loading for 3 days at the beginning of December 2020.  He did not have any proarrhythmic effects.  He converted back to sinus rhythm after 48 hours of the loading.  He then wore a 30-day event monitor in January 2021 which showed normal sinus rhythm for the most part.  He was in atrial fibrillation 8% of the time the majority of the time his atrial fibrillation rates were well controlled.      He underwent a repeat echocardiogram in June 2023 which

## 2025-05-29 ENCOUNTER — OFFICE VISIT (OUTPATIENT)
Age: 65
End: 2025-05-29
Payer: MEDICARE

## 2025-05-29 VITALS
SYSTOLIC BLOOD PRESSURE: 131 MMHG | DIASTOLIC BLOOD PRESSURE: 80 MMHG | WEIGHT: 271 LBS | HEART RATE: 78 BPM | HEIGHT: 75 IN | OXYGEN SATURATION: 97 % | TEMPERATURE: 97.3 F | BODY MASS INDEX: 33.69 KG/M2

## 2025-05-29 DIAGNOSIS — M79.89 SOFT TISSUE MASS: Primary | ICD-10-CM

## 2025-05-29 DIAGNOSIS — E66.01 SEVERE OBESITY (HCC): ICD-10-CM

## 2025-05-29 DIAGNOSIS — G47.33 OBSTRUCTIVE SLEEP APNEA SYNDROME: ICD-10-CM

## 2025-05-29 DIAGNOSIS — K43.2 INCISIONAL HERNIA, WITHOUT OBSTRUCTION OR GANGRENE: ICD-10-CM

## 2025-05-29 DIAGNOSIS — I48.91 ATRIAL FIBRILLATION, UNSPECIFIED TYPE (HCC): ICD-10-CM

## 2025-05-29 DIAGNOSIS — R22.2 MASS ON BACK: ICD-10-CM

## 2025-05-29 DIAGNOSIS — I48.0 PAROXYSMAL ATRIAL FIBRILLATION (HCC): ICD-10-CM

## 2025-05-29 DIAGNOSIS — K40.90 LEFT INGUINAL HERNIA: ICD-10-CM

## 2025-05-29 PROCEDURE — 3017F COLORECTAL CA SCREEN DOC REV: CPT | Performed by: SURGERY

## 2025-05-29 PROCEDURE — G8417 CALC BMI ABV UP PARAM F/U: HCPCS | Performed by: SURGERY

## 2025-05-29 PROCEDURE — 3079F DIAST BP 80-89 MM HG: CPT | Performed by: SURGERY

## 2025-05-29 PROCEDURE — 1126F AMNT PAIN NOTED NONE PRSNT: CPT | Performed by: SURGERY

## 2025-05-29 PROCEDURE — 1123F ACP DISCUSS/DSCN MKR DOCD: CPT | Performed by: SURGERY

## 2025-05-29 PROCEDURE — 1159F MED LIST DOCD IN RCRD: CPT | Performed by: SURGERY

## 2025-05-29 PROCEDURE — 99214 OFFICE O/P EST MOD 30 MIN: CPT | Performed by: SURGERY

## 2025-05-29 PROCEDURE — 1036F TOBACCO NON-USER: CPT | Performed by: SURGERY

## 2025-05-29 PROCEDURE — G8427 DOCREV CUR MEDS BY ELIG CLIN: HCPCS | Performed by: SURGERY

## 2025-05-29 PROCEDURE — 3075F SYST BP GE 130 - 139MM HG: CPT | Performed by: SURGERY

## 2025-05-29 NOTE — PROGRESS NOTES
Kapil Leo Jr. is a 64 y.o. male (: 1960) presenting to address:    Chief Complaint   Patient presents with    New Patient     Lump on right shoulder/referred by Dr. Fairbanks       Medication list and allergies have been reviewed with Kapil Leo Jr. and updated as of today's date.     I have gone over all Medical, Surgical and Social History with Kapil Leo Jr. and updated/added the information accordingly.

## 2025-05-29 NOTE — PROGRESS NOTES
General Surgery Consult    Kapil Leo Jr.  Admit date: (Not on file)    MRN: [id number]     : 1960     Age: 64 y.o.        Attending Physician: Segun Honeycutt MD, [facility]      History of Present Illness:      Kapil Leo Jr. is a 64 y.o. male who was referred to me by Dr. Fairbanks for evaluation of a soft tissue mass located on the right upper back toward the shoulder area in the neck area.  The patient is known to me because I have seen him 2 years ago for evaluation of an incisional hernia and then inguinal hernia that we decided to observe it given the fact that he was asymptomatic and the fact that he had a history of Crohn's disease with a laparotomy.  The patient stated that he had this mass for several years now probably 5 or 6 years and maybe it is getting slightly larger but the main problem he is having is a significant neck pain that radiates to his right shoulder.  Addition to the pain in the neck has been having shoulder pain when he moves the shoulders above 90 degree angle.  He is also been having hip pain bilaterally but mainly on the left side.     Patient Active Problem List    Diagnosis Date Noted    Atrial fibrillation (HCC) 10/27/2020    Exposure to asbestos 2023    Astigmatism 2023    Myopia 2023    Open wound of finger 2023    Presbyopia 2023    Sensorineural hearing loss 2023    Essential hypertension 2021    Multiple pulmonary nodules 2021    Paroxysmal atrial fibrillation (HCC)     Severe obesity (HCC) 2020    Status post catheter ablation of atrial fibrillation 2020    Hypothyroidism 2020    Sleep apnea     Dyslipidemia      Past Medical History:   Diagnosis Date    Arrhythmia     Crohn disease (HCC)     Dyslipidemia     GERD (gastroesophageal reflux disease)     History of cardiovascular stress test 2015    Normal stress echocardiogram    History of dizziness     Likely from autonomic insufficiency.  Occasional

## 2025-07-24 RX ORDER — DOFETILIDE 0.5 MG/1
500 CAPSULE ORAL 2 TIMES DAILY
Qty: 180 CAPSULE | Refills: 2 | Status: SHIPPED | OUTPATIENT
Start: 2025-07-24

## 2025-07-24 NOTE — TELEPHONE ENCOUNTER
PCP: Johnnie Fairbanks MD    Last appt:  4/1/2025   Future Appointments   Date Time Provider Department Center   10/1/2025  8:00 AM [facility]   10/14/2025  8:40 AM Florin Perez MD [facility] BS AMB       Requested Prescriptions     Pending Prescriptions Disp Refills    dofetilide (TIKOSYN) 500 MCG capsule [Pharmacy Med Name: DOFETILIDE 500MCG] 180 capsule 2     Sig: TAKE 1 CAPSULE BY MOUTH TWO TIMES A DAY.       Request for a 30 or 90 day supply? Provider Discretion    Pharmacy: Confirmed     Other Comments: Per your last office note:   Patient does report increased episodes what sounds like atrial fibrillation over the past 6 months. At this point I would simply continue his current regimen of dofetilide for rhythm control and Xarelto for anticoagulation.

## 2025-08-05 RX ORDER — TELMISARTAN 40 MG/1
40 TABLET ORAL DAILY
Qty: 90 TABLET | Refills: 3 | Status: SHIPPED | OUTPATIENT
Start: 2025-08-05

## (undated) DEVICE — INTRO SHTH FST-CTH 0.038INX14 --

## (undated) DEVICE — INTRODUCER SHTH 7FR CANN L11CM DIL TIP 35MM ORNG TUNGSTEN

## (undated) DEVICE — PRESSURE MONITORING SET: Brand: TRUWAVE

## (undated) DEVICE — Device: Brand: PROTRACK PIGTAIL WIRE

## (undated) DEVICE — INTRODUCER SHTH 4FR CANN L11CM DIL TIP 25MM RED TUNGSTEN

## (undated) DEVICE — KIT ELECTRICAL UMBILICAL --

## (undated) DEVICE — SUTURE PERMA HND SZ 0 L18IN NONABSORBABLE BLK L30MM PSL REV 580H

## (undated) DEVICE — CATH US DIAG ACUNAV 8FRX90CM --

## (undated) DEVICE — UNIDIRECTIONAL STEERABLE DIAGNOSTIC CATHETER: Brand: EP XT™

## (undated) DEVICE — CATH EP ACHV MPPNG 3.3FR 20MM -- ACHIEVE

## (undated) DEVICE — Device: Brand: RFP-100A CONNECTOR CABLE

## (undated) DEVICE — REM POLYHESIVE ADULT PATIENT RETURN ELECTRODE: Brand: VALLEYLAB

## (undated) DEVICE — KENDALL RADIOLUCENT FOAM MONITORING ELECTRODE RECTANGULAR SHAPE: Brand: KENDALL

## (undated) DEVICE — INTRODUCER SHTH 9FR CANN L11CM DIL TIP 35MM BLK TUNGSTEN

## (undated) DEVICE — INTRODUCER SHTH 6FR CANN L11CM DIL TIP 35MM GRN TUNGSTEN

## (undated) DEVICE — CATHETER ART 20 GAX4 CM 22 GA RADIAL FEP

## (undated) DEVICE — KIT COAX UMBILICAL FOR N20 --

## (undated) DEVICE — CATHETER ELECTROPHYSIOLOGY CRD 2 5 MM 6 FRX120 CM SUPREME

## (undated) DEVICE — Device: Brand: NRG TRANSSEPTAL NEEDLE

## (undated) DEVICE — MEDI-TRACE CADENCE ADULT, DEFIBRILLATION ELECTRODE -RTS  (10 PR/PK) - PHYSIO-CONTROL: Brand: MEDI-TRACE CADENCE

## (undated) DEVICE — SHTH STEERABLE FLEXCATH 12 FR --

## (undated) DEVICE — TORFLEX TRANSSEPTAL SHEATH; TRANSSEPTAL DILATOR; J-TIP  GUIDEWIRE: Brand: TORFLEX TRANSSEPTAL GUIDING SHEATH

## (undated) DEVICE — CABLE CATH CONN 10 PIN DISP -- ACHIEVE ADVANCE

## (undated) DEVICE — LIMB HOLDER, WRIST/ANKLE: Brand: DEROYAL

## (undated) DEVICE — TUBE SET IRR PUMP THERMALCOOL -- SMARTABLATE

## (undated) DEVICE — PACK PROCEDURE SURG VASC CATH 161 MMC LF

## (undated) DEVICE — CATH CRYOABLATN EP 28MM -- ARCTIC FRONT